# Patient Record
Sex: FEMALE
[De-identification: names, ages, dates, MRNs, and addresses within clinical notes are randomized per-mention and may not be internally consistent; named-entity substitution may affect disease eponyms.]

---

## 2018-07-05 ENCOUNTER — HOSPITAL ENCOUNTER (INPATIENT)
Dept: HOSPITAL 42 - ED | Age: 68
LOS: 2 days | Discharge: HOME | DRG: 641 | End: 2018-07-07
Attending: INTERNAL MEDICINE | Admitting: INTERNAL MEDICINE
Payer: MEDICARE

## 2018-07-05 VITALS — BODY MASS INDEX: 17.6 KG/M2

## 2018-07-05 DIAGNOSIS — M19.90: ICD-10-CM

## 2018-07-05 DIAGNOSIS — F41.1: ICD-10-CM

## 2018-07-05 DIAGNOSIS — D63.8: ICD-10-CM

## 2018-07-05 DIAGNOSIS — F41.0: ICD-10-CM

## 2018-07-05 DIAGNOSIS — F17.200: ICD-10-CM

## 2018-07-05 DIAGNOSIS — M06.9: ICD-10-CM

## 2018-07-05 DIAGNOSIS — E87.1: Primary | ICD-10-CM

## 2018-07-05 DIAGNOSIS — F31.9: ICD-10-CM

## 2018-07-05 DIAGNOSIS — F51.04: ICD-10-CM

## 2018-07-05 DIAGNOSIS — F60.4: ICD-10-CM

## 2018-07-05 LAB
ALBUMIN SERPL-MCNC: 4.3 G/DL
ALBUMIN/GLOB SERPL: 1.6 {RATIO}
ALT SERPL-CCNC: 27 U/L
APAP SERPL-MCNC: < 10 UG/ML
APPEARANCE UR: (no result)
AST SERPL-CCNC: 31 U/L
BASOPHILS # BLD AUTO: 0.01 K/MM3
BASOPHILS NFR BLD: 0.1 %
BILIRUB UR-MCNC: NEGATIVE MG/DL
BUN SERPL-MCNC: 7 MG/DL
CALCIUM SERPL-MCNC: 9.3 MG/DL
COLOR UR: YELLOW
EOSINOPHIL # BLD: 0 10*3/UL
EOSINOPHIL NFR BLD: 0.3 %
ERYTHROCYTE [DISTWIDTH] IN BLOOD BY AUTOMATED COUNT: 13.1 %
GFR NON-AFRICAN AMERICAN: > 60
GLUCOSE UR STRIP-MCNC: NEGATIVE MG/DL
GRANULOCYTES # BLD: 5.8 10*3/UL
GRANULOCYTES NFR BLD: 66.1 %
HGB BLD-MCNC: 11.2 G/DL
LEUKOCYTE ESTERASE UR-ACNC: NEGATIVE LEU/UL
LYMPHOCYTES # BLD: 2.4 10*3/UL
LYMPHOCYTES NFR BLD AUTO: 26.7 %
MCH RBC QN AUTO: 31.6 PG
MCHC RBC AUTO-ENTMCNC: 35.6 G/DL
MCV RBC AUTO: 89 FL
MONOCYTES # BLD AUTO: 0.6 10*3/UL
MONOCYTES NFR BLD: 6.8 %
OSMOLALITY,URINE: 218 MOSM/KG
PH UR STRIP: 7 [PH]
PLATELET # BLD: 335 10^3/UL
PMV BLD AUTO: 7.7 FL
PROT UR STRIP-MCNC: (no result) MG/DL
RBC # BLD AUTO: 3.54 10^6/UL
RBC # UR STRIP: (no result) /UL
RBC #/AREA URNS HPF: (no result) /HPF
SALICYLATES SERPL-MCNC: < 1 MG/DL
SP GR UR STRIP: 1.01
T4 SERPL-MCNC: 6.4 UG/DL
UROBILINOGEN UR STRIP-ACNC: 0.2 E.U./DL
WBC # BLD AUTO: 8.8 10^3/UL
WBC #/AREA URNS HPF: NEGATIVE /HPF

## 2018-07-05 NOTE — ED PDOC
Arrival/HPI





- General


Chief Complaint: Med Refill


Time Seen by Provider: 18 19:14


Historian: Patient





- History of Present Illness


Narrative History of Present Illness (Text): 





18 21:30


68-year-old female with a history of anxiety presents today stating she has 

been having intermittent anxiety attacks. Patient claims that she ran out of 

her psychiatric medications. Patient denies chest pain or shortness of breath 

at present time. Denies abdominal pain. No nausea or vomiting. Patient denies 

dizziness or weakness. Patient is unable to give an answer as to when the last 

time she took her anxiety medications. Although the [facility] aware 

website shows that she was prescribed 90 Valium on 18. 





Past Medical History





- Provider Review


Nursing Documentation Reviewed: Yes





- Travel History


Have you recently traveled outside [place] w/in the past 3 mons?: No





- Infectious Disease


Hx of Infectious Diseases: None





- Reproductive


Menopause: Yes





- Cardiac


Hx Cardiac Disorders: No





- Pulmonary


Hx Respiratory Disorders: No





- Neurological


Hx Neurological Disorder: No





- HEENT


Other/Comment: reading glasses





- Hematological/Oncological


Hx Blood Disorders: No


Other/Comment: left breast lumpectomy; benign





- Musculoskeletal/Rheumatological


Hx Herniated Disk: Yes





- Psychiatric


Hx Anxiety: Yes


Hx Panic Disorder: Yes


Hx Substance Use: No





- Surgical History


Hx  Section: Yes


Hx Hysterectomy: Yes


Other/Comment: benign tumor removed left breast





- Anesthesia


Hx Anesthesia: Yes


Hx Anesthesia Reactions: No


Hx Malignant Hyperthermia: No





Family/Social History





- Physician Review


Nursing Documentation Reviewed: Yes


Family/Social History: Unknown Family HX


Smoking Status: Light Smoker < 10 Cigarettes Daily


Hx Alcohol Use: No


Hx Substance Use: No





Allergies/Home Meds


Allergies/Adverse Reactions: 


Allergies





No Known Allergies Allergy (Verified 11/30/15 06:40)


 








Home Medications: 


 Home Meds











 Medication  Instructions  Recorded  Confirmed


 


clonazePAM [Klonopin] 3 tab PO BID 11/30/15 11/30/15


 


Citalopram Hydrobromide [Celexa] 40 mg PO DAILY 18


 


Cyclobenzaprine [Cyclobenzaprine 10 mg PO 18 





HCl]   


 


diaZEpam [Valium] 10 mg PO TID 18














Review of Systems





- Review of Systems


Constitutional: absent: Fatigue, Fevers


Respiratory: absent: SOB, Cough


Cardiovascular: absent: Chest Pain, Palpitations


Gastrointestinal: absent: Abdominal Pain, Nausea, Vomiting


Genitourinary Female: absent: Dysuria, Frequency, Hematuria


Musculoskeletal: absent: Arthralgias, Back Pain, Neck Pain


Skin: absent: Rash, Pruritis


Neurological: absent: Headache, Dizziness


Psychiatric: Anxiety.  absent: Depression, Suicidal Ideation





Physical Exam


Vital Signs Reviewed: Yes


Vital Signs











  Temp Pulse Resp BP Pulse Ox


 


 18 17:38  99.1 F  95 H  18  169/79 H  95











Temperature: Afebrile


Blood Pressure: Hypertensive


Pulse: Regular


Respiratory Rate: Normal


Appearance: Positive for: Well-Appearing, Non-Toxic, Comfortable


Pain Distress: None


Mental Status: Positive for: Alert and Oriented X 3





- Systems Exam


Head: Present: Atraumatic


Mouth: Present: Moist Mucous Membranes


Neck: Present: Normal Range of Motion


Respiratory/Chest: Present: Clear to Auscultation, Good Air Exchange.  No: 

Respiratory Distress, Accessory Muscle Use


Cardiovascular: Present: Regular Rate and Rhythm, Normal S1, S2.  No: Murmurs


Abdomen: No: Tenderness, Rebound, Guarding


Neurological: Present: GCS=15, Speech Normal


Skin: Present: Warm, Dry, Normal Color.  No: Rashes


Psychiatric: Present: Alert, Oriented x 3





Medical Decision Making


ED Course and Treatment: 





18 21:32


Patient is nontoxic well-appearing in no distress vital signs are stable.





CBC WNL


CMP NA; 121





Tylenol WNL


Salicylate WNL


Alcohol level WNL





Urine drug screen + benzos





UA; small blood





cxr: wnl





ekg NSR at 77b/m no st elevations. 








case discussed with dr. Sahu  accepts admission to [facility] for hyponatremia. she 

would like to add. urine osmolality, urine NA, serum osmolality   





all aspects of this case were discussed the attending of record. 








Impression; hyponatremia, anxiety


admit to tele. 


Reassessment Condition: Re-examined





- Lab Interpretations


Lab Results: 








 18 19:45 





 18 19:45 





 Lab Results





18 20:09: Urine Opiates Screen Negative, Urine Methadone Screen Negative, 

Ur Barbiturates Screen Negative, Ur Phencyclidine Scrn Negative, Ur 

Amphetamines Screen Negative, U Benzodiazepines Scrn Positive H, U Oth Cocaine 

Metabols Negative, U Cannabinoids Screen Negative


18 20:09: Urine Color Yellow, Urine Appearance Sl cloudy, Urine pH 7.0, 

Ur Specific Gravity 1.015, Urine Protein Trace H, Urine Glucose (UA) Negative, 

Urine Ketones Negative, Urine Blood Small H, Urine Nitrate Negative, Urine 

Bilirubin Negative, Urine Urobilinogen 0.2, Ur Leukocyte Esterase Negative, 

Urine RBC 0 - 2, Urine WBC Negative


18 19:45: Alcohol, Quantitative < 10


18 19:45: Salicylates < 1 L, Acetaminophen < 10.0 L


18 19:45: Sodium 121 L, Potassium 4.7, Chloride 88 L, Carbon Dioxide 24, 

Anion Gap 14, BUN 7, Creatinine 0.5 L, Est GFR ( Amer) > 60, Est GFR (Non

-Af Amer) > 60, Random Glucose 106, Calcium 9.3, Total Bilirubin 0.3, AST 31, 

ALT 27, Alkaline Phosphatase 50, Total Protein 7.0, Albumin 4.3, Globulin 2.7, 

Albumin/Globulin Ratio 1.6


18 19:45: WBC 8.8, RBC 3.54, Hgb 11.2 L, Hct 31.5 L, MCV 89.0, MCH 31.6, 

MCHC 35.6, RDW 13.1, Plt Count 335, MPV 7.7, Gran % 66.1, Lymph % (Auto) 26.7, 

Mono % (Auto) 6.8 H, Eos % (Auto) 0.3 L, Baso % (Auto) 0.1, Gran # 5.80, Lymph 

# (Auto) 2.4, Mono # (Auto) 0.6, Eos # (Auto) 0.0, Baso # (Auto) 0.01











- RAD Interpretation


Radiology Orders: 








18 19:15


CHEST PORTABLE [RAD] Stat 














- Medication Orders


Current Medication Orders: 











Discontinued Medications





Diazepam (Valium)  5 mg PO ONCE ONE


   Stop: 18 21:29











Disposition/Present on Arrival





- Present on Arrival


Any Indicators Present on Arrival: No


History of DVT/PE: No


History of Uncontrolled Diabetes: No


Urinary Catheter: No


History of Decub. Ulcer: No


History Surgical Site Infection Following: None





- Disposition


Have Diagnosis and Disposition been Completed?: Yes


Diagnosis: 


 Anxiety, Hyponatremia





Disposition: HOSPITALIZED


Disposition Time: 21:28


Patient Plan: Admission


Patient Problems: 


 Current Active Problems











Problem Status Onset


 


Anxiety Acute  


 


Hyponatremia Acute  











Condition: GOOD


Discharge Instructions (ExitCare):  Anxiety, Adult (DC)


Additional Instructions: 


Follow-up with primary care physician within the next 2 days


Follow-up with a psychiatrist within the next 2 days


Increase fluids


Return if symptoms worsen persist or if new concerning symptoms develop


Referrals: 


Chato Pacheco MD [Non-Staff] - Follow up with primary


Kat Sahu MD [Staff Provider] - Follow up with primary


Forms:  In[employer] (English), WORK NOTE

## 2018-07-06 LAB
% IRON SATURATION: 40 %
ALBUMIN SERPL-MCNC: 4.1 G/DL
ALBUMIN/GLOB SERPL: 1.4 {RATIO}
ALT SERPL-CCNC: 41 U/L
AST SERPL-CCNC: 35 U/L
BUN SERPL-MCNC: 8 MG/DL
CALCIUM SERPL-MCNC: 9.7 MG/DL
FERRITIN SERPL-MCNC: 116 NG/ML
FOLATE SERPL-MCNC: 18.7 NG/ML
GFR NON-AFRICAN AMERICAN: > 60
IRON SERPL-MCNC: 126 UG/DL
TIBC SERPL-MCNC: 318 UG/DL
VIT B12 SERPL-MCNC: 348 PG/ML

## 2018-07-06 RX ADMIN — NAPROXEN SODIUM SCH: 550 TABLET ORAL at 17:44

## 2018-07-06 RX ADMIN — NAPROXEN SODIUM SCH MG: 550 TABLET ORAL at 17:42

## 2018-07-06 NOTE — RAD
HISTORY:

pes eval  



COMPARISON:

11/30/2015 



FINDINGS:



LUNGS:

No active pulmonary disease.



PLEURA:

No significant pleural effusion identified, no pneumothorax apparent.



CARDIOVASCULAR:

Normal.



OSSEOUS STRUCTURES:

No significant abnormalities.



VISUALIZED UPPER ABDOMEN:

Normal.



OTHER FINDINGS:

None.



IMPRESSION:

No active disease.
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
## 2018-07-06 NOTE — CARD
--------------- APPROVED REPORT --------------





EKG Measurement

Heart [id number]

SD [id number]

[id number]

[id number]

[id number]



<Conclusion>

Normal sinus rhythm

Normal ECG

No change except the rate is faster

## 2018-07-07 VITALS — HEART RATE: 79 BPM | TEMPERATURE: 97.8 F | DIASTOLIC BLOOD PRESSURE: 82 MMHG | SYSTOLIC BLOOD PRESSURE: 137 MMHG

## 2018-07-07 VITALS — OXYGEN SATURATION: 99 % | RESPIRATION RATE: 18 BRPM

## 2018-07-07 LAB
BUN SERPL-MCNC: 17 MG/DL
CALCIUM SERPL-MCNC: 9.4 MG/DL
GFR NON-AFRICAN AMERICAN: > 60
HGB BLD-MCNC: 11.4 G/DL

## 2018-07-07 RX ADMIN — NAPROXEN SODIUM SCH MG: 550 TABLET ORAL at 09:59

## 2018-07-07 RX ADMIN — NAPROXEN SODIUM SCH: 550 TABLET ORAL at 17:45

## 2018-07-08 NOTE — DS
DATE OF EXAM:  07/07/2018



FINAL DIAGNOSES:  Hyponatremia, resolved; anemia of chronic disease;

chronic degenerative arthritis; chronic depression; chronic anxiety;

chronic insomnia; chronic smoking.



DISPOSITION:  Home.



FOLLOWUP:  The patient was advised to follow up with her PMD, Dr. Zapata in

48 hours and Dr. Pacheco, psychiatrist in 48 hours.



DISCHARGE MEDICATIONS:  She was given prescriptions for Valium 10 mg p.o.

three times a day, #6, no refills and Celexa 40 mg p.o. daily, #2, no

refills.  The patient was also advised to  Nicoderm smoking patch 7

mg per 24 hour and to cease smoking and also to have followup basic

metabolic panel under the direction of her PMD in the next 48 hours.



SUMMARY:  This is a 68-year-old female who was admitted to [facility] with hyponatremia, was found to have anemia of chronic disease and

was treated successfully with initially hypertonic saline, then normal

saline and p.o. fluid restriction.  The patient was seen by psychiatrist,

Dr. Al Bond who cleared the patient for discharge and advised her to

follow up for her routine psychiatric issues with Dr. Pacheco and I have

advised this patient to follow up with her PMD, Dr. Zapata regarding

management of her newly noted now resolved hyponatremia and chronic issues

of degenerative arthritis, anxiety neurosis, anemia of chronic disease and

at the time of discharge, she was noted to have a temperature of 98.3,

respirations 20, pulse 80 and blood pressure 142/70.  She was in a normal

sinus rhythm.  Current labs shows sodium 138, K 5.4, chloride 101, bicarb

30, BUN 17, creatinine 0.7, random blood sugar 93.  Iron 126, TIBC 318,

percent saturation 40, ferritin 116 normal.  All liver function testing was

normal.  B12 348.  Folic acid 18.7, normal T4 6.4 normal and TSH 1.09

normal.  Urinalysis showed trace protein, urine sodium 41, urine osmolarity

218.  Toxicology screen positive for benzodiazepines and white count 8800,

hemoglobin 11.4, hematocrit 33.7, platelets 335,000.  All of the above was

reviewed with the patient in the presence of her nurse and all questions

were answered.  Greater than 35 minutes was spent in the discharge

management of this patient today.  Hopefully, she will be compliant with

the above recommendations as outlined by myself and Dr. Bond from

Psychiatry.





__________________________________________

Kat Sahu MD





DD:  07/07/2018 15:59:13

DT:  07/07/2018 16:01:06

Job # [id number]



[email or web address]

## 2018-07-09 NOTE — HP
DATE OF EXAM:  07/06/2018



HISTORY OF PRESENT ILLNESS:  This 68-year-old female was examined at her

bedside and this case was reviewed in detail with herself, Dr. Bond from

Psychiatry, and emergency room physicians.  She presented to the emergency

room complaining of a panic attack.  She was requesting additional Valium

because she states she ran out of this prescription.  She is under the

psychiatric care of Dr. Pacheco, psychiatrist, and was last prescribed 90

Valium on 06/11/2018.  While in the emergency room, she was noted to be

anxious, ataxic, and hyponatremic and was admitted for further evaluation

of the above.  On further evaluation of this patient, she states that she

has chronic insomnia, chronic anxiety, and has a history of suicidal

attempt in her past.  Her EMR states that she has a history of left breast

lumpectomy for benign lesion in the past and is under the medical care of

Dr. Zapata at the [facility] in [place], [place], and

follows with Dr. Pacheco from Psychiatry and a rheumatologist as well.



SOCIAL HISTORY:  The patient states she is a smoker, social drinker,

non-IV-drug misuser.



ALLERGIES:  DENIED ANY ALLERGIES TO MEDICATION.



MEDICATIONS:  States as an outpatient, she takes Klonopin, dose unknown,

twice daily; Celexa 60 mg p.o. daily; Flexeril 10 mg daily; and Valium 10

mg p.o. t.i.d.



REVIEW OF SYSTEMS:

CONSTITUTIONAL:  Denied fever and chills.

HEAD:  No headache.

EYE:  No change in visual acuity.

Ear:  No hearing loss.

THROAT:  No swallowing difficulty.

NECK:  No stiffness.

CARDIAC:  No chest pain, no palpitation.

PULMONARY:  No cough.  No hemoptysis.

GI:  No hematemesis.  No melena.

:  No dysuria.

SKIN:  No rash.

VASCULAR:  No claudication.

PSYCHOLOGICAL:  She has chronic anxiety, depression, and insomnia.

SKIN:  No active rash at present.



On the cardiac monitor, she is in a normal sinus rhythm.



PHYSICAL EXAMINATION:

VITAL SIGNS:  Her temperature was 98.3, respirations 17, pulse 93, and

blood pressure 134/88.

HEENT:  Head:  Normocephalic, atraumatic.  Eyes:  No icterus.  Ears: 

Clear.  Throat:  Noninjected.

NECK:  Supple.

HEART:  Regular S1, S2.

LUNGS:  Clear.

ABDOMEN:  Soft.

EXTREMITIES:  No edema.

SKIN:  Without rash.

NEUROLOGICAL:  Grossly intact.

PSYCHOLOGICAL:  Alert and anxious.

VASCULAR:  Legs warm to touch.



LABORATORY DATA:  Her labs show a toxicology screen positive for

benzodiazepines.  White count 8800, hemoglobin 11.2, hematocrit 31.5,

platelets 335,000.  Admission sodium was 121, K 4.7, chloride 88, bicarb

24, BUN 7, creatinine 0.5, random blood sugar 106.  Bilirubin 0.3, AST 31,

ALT is 27, alk phos 50.  Serum osmolality, low 246.  T4 normal 6.4, TSH

normal 1.09.  Urine osmolality, low 218.  Urine sodium high, 41.  Chest

x-ray was reviewed and showed no active disease and EKG was reviewed and

showed normal sinus rhythm with nonspecific ST-T wave changes.



IMPRESSION:  A 68-year-old female with chronic anxiety, chronic depression,

history of suicidal ideation in her past, history of insomnia; now admitted

with hyponatremia of unclear etiology.  Patient denied any use of

diuretics, vomiting, or diarrhea.



PLAN:  The plan, as outlined, will be to continue 3% saline at 35 mL/hour. 

She will be seen by Dr. Bond who is outlining medication including Celexa

40 mg p.o. daily, Flexeril 10 mg p.o. at nighttime, Nicoderm 7 mg per 24

hours smoking patch to arm daily, and Valium 10 mg p.o. t.i.d.  She is also

to receive naproxen 500 mg p.o. b.i.d. for her chronic degenerative

arthritic complaints.  I have requested a CT of head, chest, abdomen, and

pelvis for completeness sake, which the patient refuses and based on a.m.

blood work, patient will be readied for discharge to home and for follow up

with her PMD, Dr. Zapata; her psychiatrist, Dr. Pacheco; and her

rheumatologist.



Greater than 75 minutes was spent in the care management, review of labs,

orders, x-rays, EKGs, and outlining of treatment plan for this patient

today.  All questions were answered.







__________________________________________

Kat Sahu MD



DD:  07/07/2018 15:54:42

DT:  07/07/2018 15:57:52

[facility] # [id number]

TIANNA

## 2018-09-02 ENCOUNTER — HOSPITAL ENCOUNTER (EMERGENCY)
Dept: HOSPITAL 42 - ED | Age: 68
Discharge: HOME | End: 2018-09-02
Payer: MEDICARE

## 2018-09-02 VITALS — OXYGEN SATURATION: 99 %

## 2018-09-02 VITALS — BODY MASS INDEX: 25.6 KG/M2

## 2018-09-02 VITALS — HEART RATE: 75 BPM | SYSTOLIC BLOOD PRESSURE: 127 MMHG | DIASTOLIC BLOOD PRESSURE: 53 MMHG

## 2018-09-02 VITALS — TEMPERATURE: 98 F | RESPIRATION RATE: 19 BRPM

## 2018-09-02 DIAGNOSIS — F41.9: Primary | ICD-10-CM

## 2018-09-02 DIAGNOSIS — F17.210: ICD-10-CM

## 2018-09-02 NOTE — ED PDOC
Arrival/HPI





- General


Historian: Patient





- History of Present Illness


Time/Duration: 1 week


Symptom Course: Unchanged


Severity Level: 1





<Quirino Zabala - Last Filed: 09/02/18 22:44>





<Марина Husain - Last Filed: 09/02/18 23:34>





- General


Time Seen by Provider: 09/02/18 21:19





- History of Present Illness


Narrative History of Present Illness (Text): 


09/02/18 21:55


Patient is a 68 year old female with PMH of anxiety and depression presenting 

to the ED with anxiety. Patient states that she is feeling anxious because she 

ran out of her medication and cannot refill them until Tuesday. She states that 

she needs to take her medications now. Patient denies suicidal or homicidal 

ideation. Patient denies headaches, fevers, chills, chest pain, abdominal pain, 

or urinary symptoms.


 (Quirino Zabala)





Past Medical History





- Provider Review


Nursing Documentation Reviewed: Yes





- Travel History


Have you recently traveled outside [place] w/in the past 3 mons?: No





- Past History


Past History: No Previous





- Infectious Disease


Hx of Infectious Diseases: None





- Cardiac


Hx Cardiac Disorders: No





- Pulmonary


Hx Respiratory Disorders: No





- Neurological


Hx Neurological Disorder: No





- HEENT


Other/Comment: reading glasses





- Hematological/Oncological


Hx Blood Disorders: No


Other/Comment: left breast lumpectomy; benign





- Musculoskeletal/Rheumatological


Hx Falls: No


Hx Herniated Disk: Yes





- Genitourinary/Gynecological


Hx Sexually Transmitted Diseases: No





- Psychiatric


Hx Anxiety: Yes


Hx Panic Disorder: Yes


Hx Substance Use: No





- Surgical History


Hx Hysterectomy: Yes


Other/Comment: benign tumor removed left breast





- Anesthesia


Hx Anesthesia: Yes


Hx Anesthesia Reactions: No


Hx Malignant Hyperthermia: No





<Quirino Zabala - Last Filed: 09/02/18 22:44>





Family/Social History





- Physician Review


Nursing Documentation Reviewed: Yes


Family/Social History: No Known Family HX


Smoking Status: Heavy Smoker > 10 Cigarettes Daily


Hx Alcohol Use: Yes


Hx Substance Use: No





<Quirino Zabala - Last Filed: 09/02/18 22:44>





Allergies/Home Meds





<Quirino Zabala - Last Filed: 09/02/18 22:44>





<Марина Husain - Last Filed: 09/02/18 23:34>


Allergies/Adverse Reactions: 


Allergies





No Known Allergies Allergy (Verified 09/02/18 21:45)


 








Home Medications: 


 Home Meds











 Medication  Instructions  Recorded  Confirmed


 


clonazePAM [Klonopin] 3 tab PO BID 11/30/15 09/02/18


 


Citalopram Hydrobromide [Celexa] 40 mg PO DAILY 07/05/18 09/02/18


 


Cyclobenzaprine [Cyclobenzaprine 10 mg PO DAILY 07/05/18 09/02/18





HCl]   


 


diaZEpam [Valium] 10 mg PO TID 07/05/18 09/02/18














Review of Systems





- Physician Review


All systems were reviewed & negative as marked: Yes





- Review of Systems


Constitutional: Normal.  absent: Fevers, Night Sweats


Eyes: Normal.  absent: Vision Changes


ENT: Normal


Respiratory: Normal.  absent: SOB


Cardiovascular: Normal.  absent: Chest Pain


Gastrointestinal: Normal.  absent: Abdominal Pain, Constipation, Diarrhea, 

Nausea, Vomiting


Genitourinary Female: Normal.  absent: Dysuria, Frequency, Hematuria


Musculoskeletal: Normal.  absent: Arthralgias


Skin: Normal.  absent: Rash, Pruritis


Neurological: Normal.  absent: Headache


Psychiatric: Anxiety





<Quirino Zabala - Last Filed: 09/02/18 22:44>





- Review of Systems


Psychiatric: Depression.  absent: Suicidal Ideation (homicidal ideation)





<Марина Husain - Last Filed: 09/02/18 23:34>





Physical Exam


Vital Signs Reviewed: Yes


Temperature: Afebrile


Blood Pressure: Normal


Pulse: Regular


Respiratory Rate: Normal


Appearance: Positive for: Well-Appearing


Pain Distress: None


Mental Status: Positive for: Alert and Oriented X 3





- Systems Exam


Head: Present: Atraumatic, Normocephalic


Pupils: Present: PERRL


Extroacular Muscles: Present: EOMI


Conjunctiva: Present: Normal


Mouth: Present: Moist Mucous Membranes


Respiratory/Chest: Present: Clear to Auscultation, Good Air Exchange.  No: 

Respiratory Distress, Accessory Muscle Use, Wheezes, Rales, Rhonchi


Cardiovascular: Present: Regular Rate and Rhythm, Normal S1, S2.  No: Murmurs, 

Rub, Gallop


Abdomen: Present: Normal Bowel Sounds.  No: Tenderness, Distention, Peritoneal 

Signs


Upper Extremity: Present: Normal Inspection.  No: Cyanosis, Edema


Lower Extremity: Present: Normal Inspection.  No: Edema, CALF TENDERNESS


Neurological: Present: GCS=15, CN II-XII Intact, Speech Normal


Skin: Present: Warm, Dry, Normal Color.  No: Rashes


Psychiatric: Present: Alert, Oriented x 3, Anxious





<Quirino Zabala - Last Filed: 09/02/18 22:44>





Vital Signs











  Temp Pulse Resp BP Pulse Ox


 


 09/02/18 22:54  98 F  75  19  127/53 L  99


 


 09/02/18 22:23  98 F  85  19  124/75  99


 


 09/02/18 21:36  98 F  78  19  116/53 L  98














Medical Decision Making


Reassessment Condition: Re-examined, Improved





<Quirino Zabala - Last Filed: 09/02/18 22:44>





<Марина Husain - Last Filed: 09/02/18 23:34>


ED Course and Treatment: 


09/02/18 21:54


Impression:


Patient is a 68 year old female presenting to the ED with anxiety.





Differential Diagnosis included but are not limited to:  


- Anxiety





Plan:


-- Diazepam


-- Flexeril





Progress Notes:


09/02/18 21:58


- Patient examined, patient states she is feeling anxious and ran out of her 

medications and need to take them today.





09/02/18 22:12


- Patient was given her home medications diazepam and flexeril. She states she 

is feeling much better and wants to go home. Patient is stable for discharge.


 (Quirino Zabala)





Patient Seen With Resident:


In agreement with resident note which contains more details about the patient. 

Patient was seen and evaluated with resident. Came up with plan and treatment 

together. 68 year old female presents complaining of anxiety and depression. 

She states she ran out of her medication that she needs to take today. 


Plan: 


-- Flexeril, Valium 


-- Reassess and disposition (Марина Husain)





- Medication Orders


Current Medication Orders: 














Discontinued Medications





Cyclobenzaprine HCl (Flexeril)  10 mg PO STAT STA


   Stop: 09/02/18 21:49


   Last Admin: 09/02/18 22:04  Dose: 10 mg





Diazepam (Valium)  10 mg PO ONCE ONE


   PRN Reason: Protocol


   Stop: 09/02/18 21:50


   Last Admin: 09/02/18 22:04  Dose: 10 mg











<Quirino Zabala - Last Filed: 09/02/18 22:44>





- PA / NP / Resident Statement


MD/ has reviewed & agrees with the documentation as recorded.


MD/ has examined the patient and agrees with the treatment plan.





- Scribe Statement


The provider has reviewed the documentation as recorded by the Scribe





<Марина Husain - Last Filed: 09/02/18 23:34>





- Scribe Statement





Angelic Beck





Provider Scribe Attestation:


All medical record entries made by the Scribe were at my direction and 

personally dictated by me. I have reviewed the chart and agree that the record 

accurately reflects my personal performance of the history, physical exam, 

medical decision making, and the department course for this patient. I have 

also personally directed, reviewed, and agree with the discharge instructions 

and disposition.


 (Марина Husain)





Disposition/Present on Arrival





- Present on Arrival


Any Indicators Present on Arrival: No


History of DVT/PE: No


History of Uncontrolled Diabetes: No


Urinary Catheter: No


History of Decub. Ulcer: No


History Surgical Site Infection Following: None





- Disposition


Have Diagnosis and Disposition been Completed?: Yes


Disposition Time: 22:18


Patient Plan: Discharge





<Quirino Zabala - Last Filed: 09/02/18 22:44>





<Марина Husain - Last Filed: 09/02/18 23:34>





- Disposition


Diagnosis: 


 Anxiety





Disposition: HOME/ ROUTINE


Condition: IMPROVED


Additional Instructions: 





NAEL WILLARD, thank you for letting us take care of you today. Your provider was  

and you were treated for anxiety. The emergency medical care you received today 

was directed at your acute symptoms. If you were prescribed any medication, 

please fill it and take as directed. It may take several days for your symptoms 

to resolve. Return to the Emergency Department if your symptoms worsen, do not 

improve, or if you have any other problems.





Please contact your doctor or call one of the physicians/clinics you have been 

referred to that are listed on the Patient Visit Information form that is 

included in your discharge packet. Bring any paperwork you were given at 

discharge with you along with any medications you are taking to your follow up 

visit. Our treatment cannot replace ongoing medical care by a primary care 

provider outside of the emergency department.





Thank you for allowing the [facility] team to be part of your care today.








If you had an X-Ray or CT scan: A Radiologist will review the ED reading if any 

change in treatment is needed we will contact you.***





If you had a blood, urine, or wound culture: It will take several days for the 

results, if any change in treatment is needed we will contact you.***





If you had an STI test: It will take 48 hours for the results. Please call 

after 1 week if you have not heard back.***


Referrals: 


Kelly Vick MD [Medical Doctor] - Follow up with primary


Forms:  [employer] (English)

## 2018-12-13 ENCOUNTER — HOSPITAL ENCOUNTER (EMERGENCY)
Dept: HOSPITAL 42 - ED | Age: 68
Discharge: LEFT BEFORE DISCHARGE | End: 2018-12-13
Payer: MEDICARE

## 2018-12-13 VITALS — HEART RATE: 86 BPM | DIASTOLIC BLOOD PRESSURE: 89 MMHG | SYSTOLIC BLOOD PRESSURE: 124 MMHG

## 2018-12-13 VITALS — RESPIRATION RATE: 18 BRPM | OXYGEN SATURATION: 100 % | TEMPERATURE: 98.5 F

## 2018-12-13 VITALS — BODY MASS INDEX: 20.4 KG/M2

## 2018-12-13 DIAGNOSIS — M06.9: ICD-10-CM

## 2018-12-13 DIAGNOSIS — R55: Primary | ICD-10-CM

## 2018-12-13 DIAGNOSIS — F17.210: ICD-10-CM
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
## 2018-12-13 LAB
ALBUMIN SERPL-MCNC: 3.9 G/DL (ref 3–4.8)
ALBUMIN/GLOB SERPL: 1.3 {RATIO} (ref 1.1–1.8)
ALT SERPL-CCNC: 33 U/L (ref 7–56)
APPEARANCE UR: CLEAR
AST SERPL-CCNC: 48 U/L (ref 14–36)
BILIRUB UR-MCNC: NEGATIVE MG/DL
BUN SERPL-MCNC: 8 MG/DL (ref 7–21)
CALCIUM SERPL-MCNC: 9.2 MG/DL (ref 8.4–10.5)
COLOR UR: YELLOW
ERYTHROCYTE [DISTWIDTH] IN BLOOD BY AUTOMATED COUNT: 13 % (ref 11.5–14.5)
GFR NON-AFRICAN AMERICAN: > 60
GLUCOSE UR STRIP-MCNC: NEGATIVE MG/DL
HGB BLD-MCNC: 10.8 G/DL (ref 12–16)
LEUKOCYTE ESTERASE UR-ACNC: NEGATIVE LEU/UL
MCH RBC QN AUTO: 31.3 PG (ref 25–35)
MCHC RBC AUTO-ENTMCNC: 34.3 G/DL (ref 31–37)
MCV RBC AUTO: 91.3 FL (ref 80–105)
PH UR STRIP: 6.5 [PH] (ref 4.7–8)
PLATELET # BLD: 386 10^3/UL (ref 120–450)
PMV BLD AUTO: 8.9 FL (ref 7–11)
PROT UR STRIP-MCNC: NEGATIVE MG/DL
RBC # BLD AUTO: 3.45 10^6/UL (ref 3.5–6.1)
RBC # UR STRIP: NEGATIVE /UL
SP GR UR STRIP: <= 1.005 (ref 1–1.03)
TROPONIN I SERPL-MCNC: < 0.01 NG/ML
UROBILINOGEN UR STRIP-ACNC: 0.2 E.U./DL
WBC # BLD AUTO: 8.9 10^3/UL (ref 4.5–11)

## 2018-12-13 PROCEDURE — 70450 CT HEAD/BRAIN W/O DYE: CPT

## 2018-12-13 PROCEDURE — 99285 EMERGENCY DEPT VISIT HI MDM: CPT

## 2018-12-13 PROCEDURE — 73610 X-RAY EXAM OF ANKLE: CPT

## 2018-12-13 PROCEDURE — 82550 ASSAY OF CK (CPK): CPT

## 2018-12-13 PROCEDURE — 83615 LACTATE (LD) (LDH) ENZYME: CPT

## 2018-12-13 PROCEDURE — 84484 ASSAY OF TROPONIN QUANT: CPT

## 2018-12-13 PROCEDURE — 73590 X-RAY EXAM OF LOWER LEG: CPT

## 2018-12-13 PROCEDURE — 93005 ELECTROCARDIOGRAM TRACING: CPT

## 2018-12-13 PROCEDURE — 81003 URINALYSIS AUTO W/O SCOPE: CPT

## 2018-12-13 PROCEDURE — 72131 CT LUMBAR SPINE W/O DYE: CPT

## 2018-12-13 PROCEDURE — 72125 CT NECK SPINE W/O DYE: CPT

## 2018-12-13 PROCEDURE — 80053 COMPREHEN METABOLIC PANEL: CPT

## 2018-12-13 PROCEDURE — 72128 CT CHEST SPINE W/O DYE: CPT

## 2018-12-13 PROCEDURE — 73560 X-RAY EXAM OF KNEE 1 OR 2: CPT

## 2018-12-13 PROCEDURE — 85027 COMPLETE CBC AUTOMATED: CPT

## 2018-12-13 NOTE — CT
Date of service: 



12/13/2018



PROCEDURE:  CT Thoracic Spine without contrast



HISTORY:

injury







COMPARISON:

None available.



TECHNIQUE:

Axial computed tomography images were obtained of the thoracic spine 

without intravenous contrast. Coronal and sagittal reformatted images 

were created and reviewed.



Radiation dose:



Total exam DLP = 217.87 mGy-cm.



This CT exam was performed using one or more of the following dose 

reduction techniques: Automated exposure control, adjustment of the 

mA and/or kV according to patient size, and/or use of iterative 

reconstruction technique.



FINDINGS:



VERTEBRAE:

Unremarkable. No fracture. Normal alignment.



DISCS/SPINAL CANAL/NEURAL FORAMINA:

Within the limits of the CT technique, no disc herniation seen. No 

central canal or neural foraminal stenosis..



PARASPINAL SOFT TISSUES:

Unremarkable.



OTHER FINDINGS:

Multilevel facet arthropathy. 



IMPRESSION:

No fracture.

## 2018-12-13 NOTE — CT
Date of service: 



12/13/2018



PROCEDURE:  CT Lumbar Spine without contrast



HISTORY:

injury



COMPARISON:

None available.



TECHNIQUE:

Axial computed tomography images were obtained of the lumbar spine 

without the use of intravenous contrast. Coronal and sagittal 

reformatted images were created and reviewed. 



Radiation dose:



Total exam DLP = 328.99 mGy-cm.



This CT exam was performed using one or more of the following dose 

reduction techniques: Automated exposure control, adjustment of the 

mA and/or kV according to patient size, and/or use of iterative 

reconstruction technique.



FINDINGS:



VERTEBRAE:

Unremarkable. No fracture. Normal alignment. 



DISCS/SPINAL CANAL/NEURAL FORAMINA:

L1-2:     Unremarkable.



L2-3:     Unremarkable.



L3-4:     Unremarkable.



L4-5:     Unremarkable.



L5-S1:  Central disc herniation.



PARASPINAL SOFT TISSUES:

Unremarkable. 



OTHER FINDINGS:

Lower lumbar facet arthropathy. 



IMPRESSION:

No fracture.  Central disc herniation at L5-S1 with anterior epidural 

fat indentation.  Lower lumbar facet arthropathy.

## 2018-12-13 NOTE — CT
Date of service: 



12/13/2018



PROCEDURE:  CT HEAD WITHOUT CONTRAST.



HISTORY:

Injury 



COMPARISON:

None available.



TECHNIQUE:

Axial computed tomography images were obtained through the head/brain 

without intravenous contrast.  



Radiation dose:



Total exam DLP = 783.41 mGy-cm.



This CT exam was performed using one or more of the following dose 

reduction techniques: Automated exposure control, adjustment of the 

mA and/or kV according to patient size, and/or use of iterative 

reconstruction technique.



FINDINGS:



HEMORRHAGE:

No intracranial hemorrhage. 



BRAIN:

Gray-white matter differentiation is preserved.  There is no mass, 

mass effect or abnormal extra-axial fluid collection.  There is no 

territorial infarction. The midline sagittal structures are normal.



VENTRICLES:

There is mild age-related global parenchymal volume loss and 

proportionate enlargement of the ventricles and cortical sulci. 



CALVARIUM:

There is no calvarial fracture or extracranial soft tissue swelling.



PARANASAL SINUSES:

Predominantly clear.



MASTOID AIR CELLS:

Predominantly clear.



OTHER FINDINGS:

None.



IMPRESSION:

No acute intracranial abnormality.



A preliminary report was provided by [facility].

## 2018-12-13 NOTE — ED PDOC
Arrival/HPI





- General


Chief Complaint: Trauma


Time Seen by Provider: 12/13/18 01:02


Historian: Patient





- History of Present Illness


Narrative History of Present Illness (Text): 


12/13/18 01:20


Jonelle Ojeda is a 68 year old female, whose past medical history includes 

rheumatoid arthritis, chronic back pain, anxiety, and depression, who presents 

to the emergency department  brought in by EMS status post fall at 21:00 

yesterday. Patient states she felt dizzy while walking and fell down a flight of

stairs. Patient now complaining of neck pain, lower back pain, and bilateral 

lower leg pain. Patient requesting pain medication. The patient denies any 

fever, chills, chest pain, shortness of breath, nausea, vomiting, urinary 

symptoms, headache, or any other complaints. 


Symptom Onset: Gradual


Symptom Course: Unchanged


Activities at Onset: Light


Context: Home, Slipped





Past Medical History





- Provider Review


Nursing Documentation Reviewed: Yes





- Past History


Past History: No Previous





- Infectious Disease


Hx of Infectious Diseases: None





- Cardiac


Hx Cardiac Disorders: No





- Pulmonary


Hx Respiratory Disorders: No





- Neurological


Hx Neurological Disorder: No





- HEENT


Other/Comment: reading glasses





- Hematological/Oncological


Hx Blood Disorders: No


Other/Comment: left breast lumpectomy; benign





- Musculoskeletal/Rheumatological


Hx Falls: No


Hx Herniated Disk: Yes





- Genitourinary/Gynecological


Hx Sexually Transmitted Diseases: No





- Psychiatric


Hx Anxiety: Yes


Hx Panic Disorder: Yes


Hx Substance Use: No





- Surgical History


Hx Hysterectomy: Yes


Other/Comment: benign tumor removed left breast





- Anesthesia


Hx Anesthesia: Yes


Hx Anesthesia Reactions: No


Hx Malignant Hyperthermia: No





Family/Social History





- Physician Review


Nursing Documentation Reviewed: Yes


Family/Social History: Unknown Family HX


Smoking Status: Heavy Smoker > 10 Cigarettes Daily


Hx Alcohol Use: Yes


Hx Substance Use: No





Allergies/Home Meds


Allergies/Adverse Reactions: 


Allergies





No Known Allergies Allergy (Verified 09/02/18 21:45)


   








Home Medications: 


                                    Home Meds











 Medication  Instructions  Recorded  Confirmed


 


clonazePAM [Klonopin] 3 tab PO BID 11/30/15 09/02/18


 


Citalopram Hydrobromide [Celexa] 40 mg PO DAILY 07/05/18 09/02/18


 


Cyclobenzaprine [Cyclobenzaprine 10 mg PO DAILY 07/05/18 09/02/18





HCl]   


 


diaZEpam [Valium] 10 mg PO TID 07/05/18 09/02/18














Review of Systems





- Physician Review


All systems were reviewed & negative as marked: Yes





- Review of Systems


Constitutional: Normal.  absent: Fevers


Eyes: Normal


ENT: Normal


Respiratory: Normal.  absent: SOB, Cough


Cardiovascular: Normal.  absent: Chest Pain


Gastrointestinal: Normal.  absent: Abdominal Pain, Diarrhea, Nausea, Vomiting


Genitourinary Female: Normal.  absent: Dysuria, Frequency, Hematuria, Urine 

Output Changes


Musculoskeletal: Arthralgias, Back Pain, Neck Pain


Skin: Normal.  absent: Rash


Neurological: Dizziness.  absent: Headache


Endocrine: Normal


Hemo/Lymphatic: Normal


Psychiatric: Normal





Physical Exam


Vital Signs Reviewed: Yes


Temperature: Afebrile


Blood Pressure: Normal


Pulse: Regular


Respiratory Rate: Normal


Appearance: Positive for: Well-Appearing, Non-Toxic, Comfortable


Pain Distress: None


Mental Status: Positive for: Alert and Oriented X 3





- Systems Exam


Head: Present: Atraumatic, Normocephalic


Pupils: Present: PERRL


Extroacular Muscles: Present: EOMI


Conjunctiva: Present: Normal


Mouth: Present: Moist Mucous Membranes


Neck: Present: Paraspinal Tenderness (Paracervical tenderness).  No: Meningeal 

Signs, MIDLINE TENDERNESS


Respiratory/Chest: Present: Clear to Auscultation, Good Air Exchange.  No: Res

piratory Distress, Accessory Muscle Use


Cardiovascular: Present: Regular Rate and Rhythm, Normal S1, S2.  No: Murmurs


Abdomen: No: Tenderness, Distention, Peritoneal Signs


Back: Present: Paraspinal Tenderness (Paralumbar tenderness).  No: CVA 

Tenderness, Midline Tenderness


Upper Extremity: Present: Normal Inspection.  No: Cyanosis, Edema


Lower Extremity: Present: Normal Inspection.  No: Edema


Neurological: Present: GCS=15, CN II-XII Intact, Speech Normal


Skin: Present: Warm, Dry, Normal Color.  No: Rashes


Psychiatric: Present: Alert, Oriented x 3, Normal Insight, Normal Concentration





Medical Decision Making


ED Course and Treatment: 


12/13/18 01:21


Impression:


68 year old female presents s/p fall complaining of lower back pain, neck pain, 

and bilateral lower leg pain.





Plan:


-- CT Head w/o contrast


-- CT Cervical Spine w/o contrast


-- CT Thoracic Spine w/o contrast


-- CT Lumbar Spine w/o contrast


-- EKG


-- Labs, alcohol level, cardiac enzymes, alcohol level


-- Urinalysis


-- XR Bilateral Knees


-- XR Bilateral Tibia/Fibula


-- XR Bilateral Ankles


-- Reassess and disposition





Prior Visits:


Notes and results from previous visits were reviewed.








Progress Notes:


Reviewed EKG, sinus bradycardia at 56 bpm. No ST-segment elevations or 

depressions, no T-wave inversions, normal intervals.





12/13/18 03:50


Reviewed radiology, XR Bilateral Knees shows no acute processes, no fractures.


XR Bilateral Tibia/Fibula shows no acute processes, no fractures.


XR Bilateral Ankles  shows no acute processes, no fractures.





CT Head:


Normal size of the ventricles and extra-axial spaces for the patient's age.  

Normal white matter tracts of the supratentorial brain.  Normal basal ganglia 

and thalami.  Normal brainstem.  Normal cerebellum.        


There is no demonstrated extra-axial, intraparenchymal, or intraventricular 

hemorrhage.


There are no findings of an acute ischemic infarction.  


Normal calvarium.  There is no demonstrated fracture.


Normal soft tissue structures. 


Normal visualized paranasal sinuses.


IMPRESSION:


Normal unenhanced CT scan of the brain.


Electronically signed on Dec 13, 2018 3:17:17 AM EST by:


Lynda Julian M.D., Certified by ABR, MSK, Neuroradiology





CT Cervical Spine:


There are diffuse spondylotic changes. Findings are demonstrated by disc space 

narrowing, osteophyte formation and degenerative endplate changes. Facet joint 

arthropathy is noted. No fracture or dislocation is seen. No aggressive bone 

lesion is noted.


Moderate multilevel degenerative disc disease more prominent from C3-C7.


Impression:


Spondylosis.


Multilevel facet joint arthropathy.


No acute bone pathology.


Electronically signed on Dec 13, 2018 3:21:15 AM EST by:


Lynda Julian M.D., Certified by [facility], MSK, Neuroradiology





CT Thoracic Spine:


There are diffuse spondylotic changes. Findings are demonstrated by disc space 

narrowing, osteophyte formation and degenerative endplate changes. Facet joint 

arthropathy is noted. No fracture or dislocation is seen. No aggressive bone 

lesion is noted.


Moderate multilevel degenerative disc disease more prominent from T2-T12 levels.


Impression:


Spondylosis.


Multilevel facet joint arthropathy.


No acute bone pathology.


Electronically signed on Dec 13, 2018 3:27:44 AM EST by:


Lynda Julian M.D., Certified by ABR, MSK, Neuroradiology





CT Lumbar Spine:


There are diffuse spondylotic changes. Findings are demonstrated by disc space 

narrowing, osteophyte formation and degenerative endplate changes. Facet joint 

arthropathy is noted. No fracture or dislocation is seen. No aggressive bone 

lesion is noted.


Moderate multilevel degenerative disc disease more prominent from T2-T12 levels.


Impression:


Spondylosis.


Multilevel facet joint arthropathy.


No acute bone pathology.


Electronically signed on Dec 13, 2018 3:31:48 AM EST by:


Lynda Julian M.D., Certified by [facility], Neuroradiology





12/13/18 04:02


Case discussed with Dr. Parmar, who is aware and agrees with plan. Accepts pt in 

to his service. Pt will go to Telemetry observation for near-syncope and falls. 

Medical resident notified.





- Lab Interpretations


I have reviewed the lab results: Yes





- RAD Interpretation


: ED Physician, Radiologist





- EKG Interpretation


Interpreted by ED Physician: Yes


Type: 12 lead EKG





- Scribe Statement


The provider has reviewed the documentation as recorded by the Casimiroibchristian Ojeda





Provider Scribe Attestation:


All medical record entries made by the Scribe were at my direction and 

personally dictated by me. I have reviewed the chart and agree that the record 

accurately reflects my personal performance of the history, physical exam, 

medical decision making, and the department course for this patient. I have also

 personally directed, reviewed, and agree with the discharge instructions and 

disposition.








Disposition/Present on Arrival





- Present on Arrival


Any Indicators Present on Arrival: No


History of DVT/PE: No


History of Uncontrolled Diabetes: No


Urinary Catheter: No


History of Decub. Ulcer: No


History Surgical Site Infection Following: None





- Disposition


Have Diagnosis and Disposition been Completed?: Yes


Diagnosis: 


 Near syncope, Fall (on) (from) other stairs and steps, initial encounter





Disposition: HOSPITALIZED


Disposition Time: 04:07


Patient Problems: 


                             Current Active Problems











Problem Status Onset


 


Fall (on) (from) other stairs and steps, initial encounter Acute 


 


Near syncope Acute 











Condition: STABLE

## 2018-12-14 NOTE — CARD
--------------- APPROVED REPORT --------------





Date of service: 12/13/2018



EKG Measurement

Heart [id number]

SD [id number]

[id number]

[id number]

[id number]



<Conclusion>

Sinus bradycardia

Otherwise normal ECG

## 2019-01-07 ENCOUNTER — HOSPITAL ENCOUNTER (EMERGENCY)
Dept: HOSPITAL 42 - ED | Age: 69
Discharge: HOME | End: 2019-01-07
Payer: MEDICARE

## 2019-01-07 VITALS
RESPIRATION RATE: 18 BRPM | DIASTOLIC BLOOD PRESSURE: 82 MMHG | HEART RATE: 68 BPM | SYSTOLIC BLOOD PRESSURE: 124 MMHG | OXYGEN SATURATION: 97 %

## 2019-01-07 VITALS — TEMPERATURE: 97.4 F

## 2019-01-07 VITALS — BODY MASS INDEX: 20.4 KG/M2

## 2019-01-07 DIAGNOSIS — F41.9: Primary | ICD-10-CM

## 2019-01-07 DIAGNOSIS — Z76.5: ICD-10-CM

## 2019-01-07 PROCEDURE — 99283 EMERGENCY DEPT VISIT LOW MDM: CPT

## 2019-01-07 PROCEDURE — 96372 THER/PROPH/DIAG INJ SC/IM: CPT

## 2019-01-07 NOTE — ED PDOC
Arrival/HPI





- General


Chief Complaint: Med Refill





Past Medical History





- Past History


Past History: No Previous





- Infectious Disease


Hx of Infectious Diseases: None





- Cardiac


Hx Cardiac Disorders: No





- Pulmonary


Hx Respiratory Disorders: No





- Neurological


Hx Neurological Disorder: No





- HEENT


Other/Comment: reading glasses





- Hematological/Oncological


Hx Blood Disorders: No


Other/Comment: left breast lumpectomy; benign





- Musculoskeletal/Rheumatological


Hx Falls: No


Hx Herniated Disk: Yes


Hx Osteoarthritis: Yes





- Genitourinary/Gynecological


Hx Sexually Transmitted Diseases: No





- Psychiatric


Hx Anxiety: Yes


Hx Panic Disorder: Yes


Hx Substance Use: No





- Surgical History


Hx Hysterectomy: Yes


Other/Comment: benign tumor removed left breast





- Anesthesia


Hx Anesthesia: Yes


Hx Anesthesia Reactions: No


Hx Malignant Hyperthermia: No





Family/Social History


Smoking Status: Heavy Smoker > 10 Cigarettes Daily


Hx Alcohol Use: Yes


Hx Substance Use: No





Allergies/Home Meds


Allergies/Adverse Reactions: 


Allergies





No Known Allergies Allergy (Verified 09/02/18 21:45)


   








Home Medications: 


                                    Home Meds











 Medication  Instructions  Recorded  Confirmed


 


clonazePAM [Klonopin] 3 tab PO BID 11/30/15 09/02/18


 


Citalopram Hydrobromide [Celexa] 40 mg PO DAILY 07/05/18 09/02/18


 


Cyclobenzaprine [Cyclobenzaprine 10 mg PO DAILY 07/05/18 09/02/18





HCl]   


 


diaZEpam [Valium] 10 mg PO TID 07/05/18 09/02/18














Physical Exam





Vital Signs











  Temp Pulse Resp BP Pulse Ox


 


 01/07/19 02:15  97.4 F L  74  20  127/88  95














Medical Decision Making





- Medication Orders


Current Medication Orders: 














Discontinued Medications





Alprazolam (Xanax)  0.5 mg PO STAT STA; Protocol


   Stop: 01/07/19 02:37


Ketorolac Tromethamine (Toradol)  60 mg IM STAT STA


   Stop: 01/07/19 03:00











Disposition/Present on Arrival





- Present on Arrival


Any Indicators Present on Arrival: No


History of DVT/PE: No


History of Uncontrolled Diabetes: No


Urinary Catheter: No


History of Decub. Ulcer: No


History Surgical Site Infection Following: None





- Disposition


Have Diagnosis and Disposition been Completed?: Yes


Diagnosis: 


 Anxiety, Malingering





Disposition: HOME/ ROUTINE


Disposition Time: 03:12


Patient Plan: Discharge


Condition: STABLE


Discharge Instructions (ExitCare):  Anxiety, Adult (DC)


Print Language: ENGLISH


Additional Instructions: 





All medical record entries made by the Scribe were at my direction and 

personally dictated by me. I have reviewed the chart and agree that the record 

accurately reflects my personal performance of the history, physical exam, 

medical decision making, and the department course for this patient. I have also

 personally directed, reviewed, and agree with the discharge instructions and 

disposition.


Referrals: 


Chato Pacheco MD [Non-Staff] - Follow up with primary

## 2019-01-15 ENCOUNTER — HOSPITAL ENCOUNTER (INPATIENT)
Dept: HOSPITAL 42 - ED | Age: 69
LOS: 3 days | Discharge: HOME | DRG: 948 | End: 2019-01-18
Attending: INTERNAL MEDICINE | Admitting: INTERNAL MEDICINE
Payer: MEDICARE

## 2019-01-15 VITALS — BODY MASS INDEX: 23.4 KG/M2

## 2019-01-15 DIAGNOSIS — M06.9: ICD-10-CM

## 2019-01-15 DIAGNOSIS — R41.0: Primary | ICD-10-CM

## 2019-01-15 DIAGNOSIS — G89.29: ICD-10-CM

## 2019-01-15 DIAGNOSIS — M54.9: ICD-10-CM

## 2019-01-15 DIAGNOSIS — F17.210: ICD-10-CM

## 2019-01-15 DIAGNOSIS — D72.829: ICD-10-CM

## 2019-01-15 DIAGNOSIS — F32.9: ICD-10-CM

## 2019-01-15 DIAGNOSIS — I10: ICD-10-CM

## 2019-01-15 DIAGNOSIS — E87.1: ICD-10-CM

## 2019-01-15 DIAGNOSIS — F22: ICD-10-CM

## 2019-01-15 DIAGNOSIS — F41.9: ICD-10-CM

## 2019-01-15 LAB
ALBUMIN SERPL-MCNC: 4.7 G/DL (ref 3–4.8)
ALBUMIN/GLOB SERPL: 1.7 {RATIO} (ref 1.1–1.8)
ALT SERPL-CCNC: 26 U/L (ref 7–56)
APAP SERPL-MCNC: < 10 UG/ML (ref 10–20)
APPEARANCE UR: CLEAR
AST SERPL-CCNC: 20 U/L (ref 14–36)
BACTERIA #/AREA URNS HPF: (no result) /HPF
BASOPHILS # BLD AUTO: 0.04 K/MM3 (ref 0–2)
BASOPHILS NFR BLD: 0.4 % (ref 0–3)
BILIRUB UR-MCNC: (no result) MG/DL
BUN SERPL-MCNC: 12 MG/DL (ref 7–21)
CALCIUM SERPL-MCNC: 10.6 MG/DL (ref 8.4–10.5)
COLOR UR: YELLOW
EOSINOPHIL # BLD: 0 10*3/UL (ref 0–0.7)
EOSINOPHIL NFR BLD: 0.2 % (ref 1.5–5)
ERYTHROCYTE [DISTWIDTH] IN BLOOD BY AUTOMATED COUNT: 13 % (ref 11.5–14.5)
GFR NON-AFRICAN AMERICAN: > 60
GLUCOSE UR STRIP-MCNC: NEGATIVE MG/DL
GRANULOCYTES # BLD: 6.41 10*3/UL (ref 1.4–6.5)
GRANULOCYTES NFR BLD: 63.3 % (ref 50–68)
HGB BLD-MCNC: 12.6 G/DL (ref 12–16)
LEUKOCYTE ESTERASE UR-ACNC: NEGATIVE LEU/UL
LYMPHOCYTES # BLD: 2.9 10*3/UL (ref 1.2–3.4)
LYMPHOCYTES NFR BLD AUTO: 28.8 % (ref 22–35)
MCH RBC QN AUTO: 31.1 PG (ref 25–35)
MCHC RBC AUTO-ENTMCNC: 34.7 G/DL (ref 31–37)
MCV RBC AUTO: 89.6 FL (ref 80–105)
MONOCYTES # BLD AUTO: 0.7 10*3/UL (ref 0.1–0.6)
MONOCYTES NFR BLD: 7.3 % (ref 1–6)
PH UR STRIP: 6 [PH] (ref 4.7–8)
PLATELET # BLD: 526 10^3/UL (ref 120–450)
PMV BLD AUTO: 8.6 FL (ref 7–11)
PROT UR STRIP-MCNC: NEGATIVE MG/DL
RBC # BLD AUTO: 4.05 10^6/UL (ref 3.5–6.1)
RBC # UR STRIP: (no result) /UL
SALICYLATES SERPL-MCNC: < 1 MG/DL (ref 2–20)
SP GR UR STRIP: >= 1.03 (ref 1–1.03)
UROBILINOGEN UR STRIP-ACNC: 0.2 E.U./DL
WBC # BLD AUTO: 10.1 10^3/UL (ref 4.5–11)
WBC #/AREA URNS HPF: (no result) /HPF (ref 0–6)

## 2019-01-15 NOTE — CP.PCM.HP
<ChepeBarriga - Last Filed: 01/15/19 20:59>





History of Present Illness





- History of Present Illness


History of Present Illness: 





Nithya oM, PGY-1 Medicine H&P Note for Dr. Pelaez:


CC: AMS


Pt is a 70 yo F with pmhx of rheumatoid arthritis, chronic back pain, anxiety, 

HTN and depression who presents to the ED for AMS. Pt is actively confused, 

tangential and is having delusions of people having body parts and heads that 

are not matching. Pt also claims that her meds have run out but when confirmed 

by pharmacy, and by looking at recent visits she was here for a similar problem 

and had her medication filled and picked up within the past week. Pts PMD, Marci Robin was called and stated that this was something new for the pt. 

Further HPI and ROS could not be obtained due to pts current confused and 

agitated state. Other Hx was obtained through chart review. At this time Pt den

ies SI/HI.





Pmhx: Rheumatoid arthritis, chronic back pain, anxiety, HTN and depression


Pshx: L breast benign tumor


Meds: Celexa 40 po qd, flexeril 10qd, neurotin 100 BID, percocet , 

restoril 30, Xanax 2 BID


All: NKDA


Social: Unable to obtain


Fam: Unable to obtain


PMD: Marci Zapata


Psych: Dr. Chato Ugalde


Pharm: [facility]








Present on Admission





- Present on Admission


Any Indicators Present on Admission: No





Review of Systems





- Review of Systems


Systems not reviewed;Unavailable: Altered Mental Status





Past Patient History





- Infectious Disease


Hx of Infectious Diseases: None





- Past Social History


Smoking Status: Heavy Smoker > 10 Cigarettes Daily





- CARDIAC


Hx Cardiac Disorders: No





- PULMONARY


Hx Respiratory Disorders: No





- NEUROLOGICAL


Hx Neurological Disorder: No





- HEENT


Other/Comment: reading glasses





- HEMATOLOGICAL/ONCOLOGICAL


Hx Blood Disorders: No


Other/Comment: left breast lumpectomy; benign





- MUSCULOSKELETAL/RHEUMATOLOGICAL


Hx Falls: No


Hx Herniated Disk: Yes


Hx Osteoarthritis: Yes





- GENITOURINARY/GYNECOLOGICAL


Hx Sexually Transmitted Disorders: No





- PSYCHIATRIC


Hx Anxiety: Yes


Hx Panic Symptoms: Yes


Hx Substance Use: No





- SURGICAL HISTORY


Hx Hysterectomy: Yes


Other/Comment: benign tumor removed left breast





- ANESTHESIA


Hx Anesthesia: Yes


Hx Anesthesia Reactions: No


Hx Malignant Hyperthermia: No





Meds


Allergies/Adverse Reactions: 


                                    Allergies











Allergy/AdvReac Type Severity Reaction Status Date / Time


 


No Known Allergies Allergy   Verified 09/02/18 21:45














Physical Exam





- Constitutional


Appears: Non-toxic, No Acute Distress, Agitated, Confused





- Head Exam


Head Exam: ATRAUMATIC, NORMAL INSPECTION, NORMOCEPHALIC





- Eye Exam


Eye Exam: EOMI, Normal appearance, PERRL





- Respiratory Exam


Respiratory Exam: Clear to Auscultation Bilateral, NORMAL BREATHING PATTERN.  

absent: Accessory Muscle Use, Rales, Rhonchi, Wheezes, Respiratory Distress, 

Stridor





- Cardiovascular Exam


Cardiovascular Exam: RRR, +S1, +S2.  absent: Gallop, Rubs





- GI/Abdominal Exam


GI & Abdominal Exam: Normal Bowel Sounds, Soft.  absent: Distended, Firm, 

Guarding, Tenderness





- Extremities Exam


Extremities exam: Positive for: normal inspection.  Negative for: calf 

tenderness, pedal edema





- Back Exam


Back exam: NORMAL INSPECTION.  absent: CVA tenderness (L), CVA tenderness (R)





- Neurological Exam


Neurological exam: Altered


Additional comments: 





Pt refused to comply with neuro exam, no facial droop or slurred speech noted, 

pt was able to move all extremities freely without limitation of pain or 

weakness





- Psychiatric Exam


Psychiatric exam: Agitated


Additional comments: 





Denies SI/HI





- Skin


Skin Exam: Dry, Normal Color, Warm





Results





- Vital Signs


Recent Vital Signs: 





                                Last Vital Signs











Temp  98.1 F   01/15/19 12:13


 


Pulse  73   01/15/19 14:12


 


Resp  18   01/15/19 14:12


 


BP  163/81 H  01/15/19 16:16


 


Pulse Ox  98   01/15/19 14:12














- Labs


Result Diagrams: 


                                 01/15/19 13:40





                                 01/15/19 13:40


Labs: 





                         Laboratory Results - last 24 hr











  01/15/19 01/15/19 01/15/19





  13:40 13:40 13:40


 


WBC  10.1  


 


RBC  4.05  


 


Hgb  12.6  


 


Hct  36.3  


 


MCV  89.6  


 


MCH  31.1  


 


MCHC  34.7  


 


RDW  13.0  


 


Plt Count  526 H  


 


MPV  8.6  


 


Gran %  63.3  


 


Lymph % (Auto)  28.8  


 


Mono % (Auto)  7.3 H  


 


Eos % (Auto)  0.2 L  


 


Baso % (Auto)  0.4  


 


Gran #  6.41  


 


Lymph # (Auto)  2.9  


 


Mono # (Auto)  0.7 H  


 


Eos # (Auto)  0.0  


 


Baso # (Auto)  0.04  


 


Sodium   132 


 


Potassium   4.3 


 


Chloride   99 


 


Carbon Dioxide   25 


 


Anion Gap   12 


 


BUN   12 


 


Creatinine   0.5 L 


 


Est GFR ( Amer)   > 60 


 


Est GFR (Non-Af Amer)   > 60 


 


Random Glucose   95 


 


Calcium   10.6 H 


 


Magnesium   1.9 


 


Total Bilirubin   0.6 


 


AST   20 


 


ALT   26 


 


Alkaline Phosphatase   55 


 


Ammonia   


 


Total Protein   7.5 


 


Albumin   4.7 


 


Globulin   2.8 


 


Albumin/Globulin Ratio   1.7 


 


Urine Color   


 


Urine Appearance   


 


Urine pH   


 


Ur Specific Gravity   


 


Urine Protein   


 


Urine Glucose (UA)   


 


Urine Ketones   


 


Urine Blood   


 


Urine Nitrate   


 


Urine Bilirubin   


 


Urine Urobilinogen   


 


Ur Leukocyte Esterase   


 


Urine RBC   


 


Urine WBC   


 


Ur Epithelial Cells   


 


Urine Bacteria   


 


Salicylates   


 


Urine Opiates Screen   


 


Urine Methadone Screen   


 


Acetaminophen   


 


Ur Barbiturates Screen   


 


Ur Phencyclidine Scrn   


 


Ur Amphetamines Screen   


 


U Benzodiazepines Scrn   


 


U Oth Cocaine Metabols   


 


U Cannabinoids Screen   


 


Alcohol, Quantitative    < 10














  01/15/19 01/15/19 01/15/19





  16:59 16:59 17:49


 


WBC   


 


RBC   


 


Hgb   


 


Hct   


 


MCV   


 


MCH   


 


MCHC   


 


RDW   


 


Plt Count   


 


MPV   


 


Gran %   


 


Lymph % (Auto)   


 


Mono % (Auto)   


 


Eos % (Auto)   


 


Baso % (Auto)   


 


Gran #   


 


Lymph # (Auto)   


 


Mono # (Auto)   


 


Eos # (Auto)   


 


Baso # (Auto)   


 


Sodium   


 


Potassium   


 


Chloride   


 


Carbon Dioxide   


 


Anion Gap   


 


BUN   


 


Creatinine   


 


Est GFR ( Amer)   


 


Est GFR (Non-Af Amer)   


 


Random Glucose   


 


Calcium   


 


Magnesium   


 


Total Bilirubin   


 


AST   


 


ALT   


 


Alkaline Phosphatase   


 


Ammonia    < 9 L


 


Total Protein   


 


Albumin   


 


Globulin   


 


Albumin/Globulin Ratio   


 


Urine Color  Yellow  


 


Urine Appearance  Clear  


 


Urine pH  6.0  


 


Ur Specific Gravity  >= 1.030  


 


Urine Protein  Negative  


 


Urine Glucose (UA)  Negative  


 


Urine Ketones  15 H  


 


Urine Blood  Trace-intact H  


 


Urine Nitrate  Negative  


 


Urine Bilirubin  Small H  


 


Urine Urobilinogen  0.2  


 


Ur Leukocyte Esterase  Negative  


 


Urine RBC  1 - 3 H  


 


Urine WBC  0 - 2  


 


Ur Epithelial Cells  3 - 4  


 


Urine Bacteria  Few  


 


Salicylates   


 


Urine Opiates Screen   Negative 


 


Urine Methadone Screen   Negative 


 


Acetaminophen   


 


Ur Barbiturates Screen   Negative 


 


Ur Phencyclidine Scrn   Negative 


 


Ur Amphetamines Screen   Negative 


 


U Benzodiazepines Scrn   Positive H 


 


U Oth Cocaine Metabols   Negative 


 


U Cannabinoids Screen   Positive H 


 


Alcohol, Quantitative   














  01/15/19





  17:57


 


WBC 


 


RBC 


 


Hgb 


 


Hct 


 


MCV 


 


MCH 


 


MCHC 


 


RDW 


 


Plt Count 


 


MPV 


 


Gran % 


 


Lymph % (Auto) 


 


Mono % (Auto) 


 


Eos % (Auto) 


 


Baso % (Auto) 


 


Gran # 


 


Lymph # (Auto) 


 


Mono # (Auto) 


 


Eos # (Auto) 


 


Baso # (Auto) 


 


Sodium 


 


Potassium 


 


Chloride 


 


Carbon Dioxide 


 


Anion Gap 


 


BUN 


 


Creatinine 


 


Est GFR ( Amer) 


 


Est GFR (Non-Af Amer) 


 


Random Glucose 


 


Calcium 


 


Magnesium 


 


Total Bilirubin 


 


AST 


 


ALT 


 


Alkaline Phosphatase 


 


Ammonia 


 


Total Protein 


 


Albumin 


 


Globulin 


 


Albumin/Globulin Ratio 


 


Urine Color 


 


Urine Appearance 


 


Urine pH 


 


Ur Specific Gravity 


 


Urine Protein 


 


Urine Glucose (UA) 


 


Urine Ketones 


 


Urine Blood 


 


Urine Nitrate 


 


Urine Bilirubin 


 


Urine Urobilinogen 


 


Ur Leukocyte Esterase 


 


Urine RBC 


 


Urine WBC 


 


Ur Epithelial Cells 


 


Urine Bacteria 


 


Salicylates  < 1 L


 


Urine Opiates Screen 


 


Urine Methadone Screen 


 


Acetaminophen  < 10.0 L


 


Ur Barbiturates Screen 


 


Ur Phencyclidine Scrn 


 


Ur Amphetamines Screen 


 


U Benzodiazepines Scrn 


 


U Oth Cocaine Metabols 


 


U Cannabinoids Screen 


 


Alcohol, Quantitative 














Assessment & Plan





- Assessment and Plan (Free Text)


Assessment: 





Pt is a 70 yo F with pmhx of rheumatoid arthritis, chronic back pain, anxiety, 

HTN and depression who presents to the ED for AMS. Head CT was negative for 

intracranial path, CXR and UA was also negative. 


Plan: 





1) AMS


- 2/2 Delerium vs Psychosis, less likely infectious due to pt being afebrile, no

WBC count and UA and CXR are negative for infectious process


- Pt sees Dr. Chato Ugalde as her psychiatrist 


- Per pts pharmacy she just had her celexa picked up on 1/10/19


- Psychiatry consult


- f/u ammonia level


- UDS





2) Hx of Chronic Back Pain:


- Hold flexeril and Percocet since pt is not complaining of pain at this time





3) Hx of HTN


- Cont pts home lopression now and lisinopril in AM 





4) Hx of Anxiety:


- Switched pts xanax from 2mg scheduled to 2 mg PRN 





PPX:


DVT: SCDs


Regular Diet





Case Seen and discussed with Dr. Latanya Mo, PGY-1





<Harriet Pelaez - Last Filed: 01/16/19 08:16>





Results





- Vital Signs


Recent Vital Signs: 





                                Last Vital Signs











Temp  98.0 F   01/15/19 18:43


 


Pulse  90   01/15/19 19:21


 


Resp  20   01/15/19 22:28


 


BP  173/88 H  01/15/19 19:21


 


Pulse Ox  100   01/15/19 18:43














- Labs


Result Diagrams: 


                                 01/16/19 07:00





                                 01/16/19 07:00


Labs: 





                         Laboratory Results - last 24 hr











  01/15/19 01/15/19 01/15/19





  13:40 13:40 13:40


 


WBC  10.1  


 


RBC  4.05  


 


Hgb  12.6  


 


Hct  36.3  


 


MCV  89.6  


 


MCH  31.1  


 


MCHC  34.7  


 


RDW  13.0  


 


Plt Count  526 H  


 


MPV  8.6  


 


Gran %  63.3  


 


Lymph % (Auto)  28.8  


 


Mono % (Auto)  7.3 H  


 


Eos % (Auto)  0.2 L  


 


Baso % (Auto)  0.4  


 


Gran #  6.41  


 


Lymph # (Auto)  2.9  


 


Mono # (Auto)  0.7 H  


 


Eos # (Auto)  0.0  


 


Baso # (Auto)  0.04  


 


Sodium   132 


 


Potassium   4.3 


 


Chloride   99 


 


Carbon Dioxide   25 


 


Anion Gap   12 


 


BUN   12 


 


Creatinine   0.5 L 


 


Est GFR ( Amer)   > 60 


 


Est GFR (Non-Af Amer)   > 60 


 


Random Glucose   95 


 


Calcium   10.6 H 


 


Magnesium   1.9 


 


Total Bilirubin   0.6 


 


AST   20 


 


ALT   26 


 


Alkaline Phosphatase   55 


 


Ammonia   


 


Total Protein   7.5 


 


Albumin   4.7 


 


Globulin   2.8 


 


Albumin/Globulin Ratio   1.7 


 


TSH 3rd Generation   


 


Urine Color   


 


Urine Appearance   


 


Urine pH   


 


Ur Specific Gravity   


 


Urine Protein   


 


Urine Glucose (UA)   


 


Urine Ketones   


 


Urine Blood   


 


Urine Nitrate   


 


Urine Bilirubin   


 


Urine Urobilinogen   


 


Ur Leukocyte Esterase   


 


Urine RBC   


 


Urine WBC   


 


Ur Epithelial Cells   


 


Urine Bacteria   


 


Salicylates   


 


Urine Opiates Screen   


 


Urine Methadone Screen   


 


Acetaminophen   


 


Ur Barbiturates Screen   


 


Ur Phencyclidine Scrn   


 


Ur Amphetamines Screen   


 


U Benzodiazepines Scrn   


 


U Oth Cocaine Metabols   


 


U Cannabinoids Screen   


 


Alcohol, Quantitative    < 10














  01/15/19 01/15/19 01/15/19





  16:59 16:59 17:49


 


WBC   


 


RBC   


 


Hgb   


 


Hct   


 


MCV   


 


MCH   


 


MCHC   


 


RDW   


 


Plt Count   


 


MPV   


 


Gran %   


 


Lymph % (Auto)   


 


Mono % (Auto)   


 


Eos % (Auto)   


 


Baso % (Auto)   


 


Gran #   


 


Lymph # (Auto)   


 


Mono # (Auto)   


 


Eos # (Auto)   


 


Baso # (Auto)   


 


Sodium   


 


Potassium   


 


Chloride   


 


Carbon Dioxide   


 


Anion Gap   


 


BUN   


 


Creatinine   


 


Est GFR ( Amer)   


 


Est GFR (Non-Af Amer)   


 


Random Glucose   


 


Calcium   


 


Magnesium   


 


Total Bilirubin   


 


AST   


 


ALT   


 


Alkaline Phosphatase   


 


Ammonia    < 9 L


 


Total Protein   


 


Albumin   


 


Globulin   


 


Albumin/Globulin Ratio   


 


TSH 3rd Generation   


 


Urine Color  Yellow  


 


Urine Appearance  Clear  


 


Urine pH  6.0  


 


Ur Specific Gravity  >= 1.030  


 


Urine Protein  Negative  


 


Urine Glucose (UA)  Negative  


 


Urine Ketones  15 H  


 


Urine Blood  Trace-intact H  


 


Urine Nitrate  Negative  


 


Urine Bilirubin  Small H  


 


Urine Urobilinogen  0.2  


 


Ur Leukocyte Esterase  Negative  


 


Urine RBC  1 - 3 H  


 


Urine WBC  0 - 2  


 


Ur Epithelial Cells  3 - 4  


 


Urine Bacteria  Few  


 


Salicylates   


 


Urine Opiates Screen   Negative 


 


Urine Methadone Screen   Negative 


 


Acetaminophen   


 


Ur Barbiturates Screen   Negative 


 


Ur Phencyclidine Scrn   Negative 


 


Ur Amphetamines Screen   Negative 


 


U Benzodiazepines Scrn   Positive H 


 


U Oth Cocaine Metabols   Negative 


 


U Cannabinoids Screen   Positive H 


 


Alcohol, Quantitative   














  01/15/19 01/16/19 01/16/19





  17:57 07:00 07:00


 


WBC   11.7 H 


 


RBC   3.61 


 


Hgb   11.2 L 


 


Hct   32.2 L 


 


MCV   89.2 


 


MCH   31.0 


 


MCHC   34.8 


 


RDW   12.8 


 


Plt Count   430 


 


MPV   8.1 


 


Gran %   50.6 


 


Lymph % (Auto)   41.5 H 


 


Mono % (Auto)   7.2 H 


 


Eos % (Auto)   0.3 L 


 


Baso % (Auto)   0.4 


 


Gran #   5.94 


 


Lymph # (Auto)   4.9 H 


 


Mono # (Auto)   0.9 H 


 


Eos # (Auto)   0.0 


 


Baso # (Auto)   0.05 


 


Sodium    127 L


 


Potassium    4.0


 


Chloride    94 L


 


Carbon Dioxide    24


 


Anion Gap    13


 


BUN    15


 


Creatinine    0.7


 


Est GFR ( Amer)    > 60


 


Est GFR (Non-Af Amer)    > 60


 


Random Glucose    74


 


Calcium    9.8


 


Magnesium   


 


Total Bilirubin    0.6


 


AST    27


 


ALT    27


 


Alkaline Phosphatase    52


 


Ammonia   


 


Total Protein    7.1


 


Albumin    4.3


 


Globulin    2.7


 


Albumin/Globulin Ratio    1.6


 


TSH 3rd Generation   


 


Urine Color   


 


Urine Appearance   


 


Urine pH   


 


Ur Specific Gravity   


 


Urine Protein   


 


Urine Glucose (UA)   


 


Urine Ketones   


 


Urine Blood   


 


Urine Nitrate   


 


Urine Bilirubin   


 


Urine Urobilinogen   


 


Ur Leukocyte Esterase   


 


Urine RBC   


 


Urine WBC   


 


Ur Epithelial Cells   


 


Urine Bacteria   


 


Salicylates  < 1 L  


 


Urine Opiates Screen   


 


Urine Methadone Screen   


 


Acetaminophen  < 10.0 L  


 


Ur Barbiturates Screen   


 


Ur Phencyclidine Scrn   


 


Ur Amphetamines Screen   


 


U Benzodiazepines Scrn   


 


U Oth Cocaine Metabols   


 


U Cannabinoids Screen   


 


Alcohol, Quantitative   














  01/16/19





  07:00


 


WBC 


 


RBC 


 


Hgb 


 


Hct 


 


MCV 


 


MCH 


 


MCHC 


 


RDW 


 


Plt Count 


 


MPV 


 


Gran % 


 


Lymph % (Auto) 


 


Mono % (Auto) 


 


Eos % (Auto) 


 


Baso % (Auto) 


 


Gran # 


 


Lymph # (Auto) 


 


Mono # (Auto) 


 


Eos # (Auto) 


 


Baso # (Auto) 


 


Sodium 


 


Potassium 


 


Chloride 


 


Carbon Dioxide 


 


Anion Gap 


 


BUN 


 


Creatinine 


 


Est GFR ( Amer) 


 


Est GFR (Non-Af Amer) 


 


Random Glucose 


 


Calcium 


 


Magnesium 


 


Total Bilirubin 


 


AST 


 


ALT 


 


Alkaline Phosphatase 


 


Ammonia 


 


Total Protein 


 


Albumin 


 


Globulin 


 


Albumin/Globulin Ratio 


 


TSH 3rd Generation  0.58


 


Urine Color 


 


Urine Appearance 


 


Urine pH 


 


Ur Specific Gravity 


 


Urine Protein 


 


Urine Glucose (UA) 


 


Urine Ketones 


 


Urine Blood 


 


Urine Nitrate 


 


Urine Bilirubin 


 


Urine Urobilinogen 


 


Ur Leukocyte Esterase 


 


Urine RBC 


 


Urine WBC 


 


Ur Epithelial Cells 


 


Urine Bacteria 


 


Salicylates 


 


Urine Opiates Screen 


 


Urine Methadone Screen 


 


Acetaminophen 


 


Ur Barbiturates Screen 


 


Ur Phencyclidine Scrn 


 


Ur Amphetamines Screen 


 


U Benzodiazepines Scrn 


 


U Oth Cocaine Metabols 


 


U Cannabinoids Screen 


 


Alcohol, Quantitative 














Attending/Attestation





- Attestation


I have personally seen and examined this patient.: Yes


I have fully participated in the care of the patient.: Yes


I have reviewed all pertinent clinical information: Yes


Notes (Text): 





01/15/19 


69 year old female with past medical history of rheumatoid arthritis, anxiety, 

depression and hypertension who presents with AMS/delirium.  CT head was 

negative.  UA not suggestive of UTI.  Urine drug screen positive for 

benzodiazepines and cannabinoids.  Psychiatry evaluation is requested.  Continue

with home medications for hypertension.





Harriet Pelaez MD


Hospitalist.

## 2019-01-15 NOTE — RAD
Date of service: 



01/15/2019



HISTORY:

 chest pain 



COMPARISON:

07/05/2018. 



FINDINGS:



LUNGS:

The lungs are well inflated and clear.



PLEURA:

No pleural effusions or pneumothorax. 



CARDIOVASCULAR:

The heart is normal in size.  No aortic atherosclerotic 

calcifications present. 



OSSEOUS STRUCTURES:

Within normal limits for the patient's age.



VISUALIZED UPPER ABDOMEN:

Normal.



OTHER FINDINGS:

None.



IMPRESSION:

No active pulmonary disease.

## 2019-01-15 NOTE — ED PDOC
Arrival/HPI





- General


Chief Complaint: Psychiatric Evaluation


Time Seen by Provider: 01/15/19 12:07


Historian: Patient





- History of Present Illness


Narrative History of Present Illness (Text): 





01/15/19 12:22


A 33 year old female, whose past medical history includes rheumatoid arthritis, 

chronic back pain, anxiety, and depression, presents to the emergency department

for altered mental status. Patient is speaking in tangents and in nonsensical 

answers saying things such as I hope those people are okay, their hands are 

hurt". HPI and ROS are limited due to patient's confused state.





PMD: Marci Robin





Time/Duration: 4-6 hours


Symptom Onset: Gradual


Symptom Course: Unchanged


Activities at Onset: Light


Context: Home





Past Medical History





- Provider Review


Nursing Documentation Reviewed: Yes





- Past History


Past History: No Previous





- Infectious Disease


Hx of Infectious Diseases: None





- Reproductive


Menopause: Yes





- Cardiac


Hx Cardiac Disorders: No





- Pulmonary


Hx Respiratory Disorders: No





- Neurological


Hx Neurological Disorder: No





- HEENT


Other/Comment: reading glasses





- Hematological/Oncological


Hx Blood Disorders: No


Other/Comment: left breast lumpectomy; benign





- Musculoskeletal/Rheumatological


Hx Falls: No


Hx Herniated Disk: Yes


Hx Osteoarthritis: Yes





- Genitourinary/Gynecological


Hx Sexually Transmitted Diseases: No





- Psychiatric


Hx Anxiety: Yes


Hx Panic Disorder: Yes


Hx Substance Use: No





- Surgical History


Hx Hysterectomy: Yes


Other/Comment: benign tumor removed left breast





- Anesthesia


Hx Anesthesia: Yes


Hx Anesthesia Reactions: No


Hx Malignant Hyperthermia: No





Family/Social History





- Physician Review


Nursing Documentation Reviewed: Yes


Family/Social History: No Known Family HX


Smoking Status: Heavy Smoker > 10 Cigarettes Daily


Hx Alcohol Use: Yes


Hx Substance Use: No





Allergies/Home Meds


Allergies/Adverse Reactions: 


Allergies





No Known Allergies Allergy (Verified 09/02/18 21:45)


   








Home Medications: 


                                    Home Meds











 Medication  Instructions  Recorded  Confirmed


 


Citalopram Hydrobromide [Celexa] 40 mg PO DAILY 07/05/18 01/15/19


 


Cyclobenzaprine [Cyclobenzaprine 10 mg PO DAILY 07/05/18 01/15/19





HCl]   


 


Alprazolam [Xanax] 2 mg PO BID 01/15/19 01/15/19


 


Gabapentin [Neurontin] 100 mg PO BID 01/15/19 01/15/19


 


Oxycodone HCl/Acetaminophen 10 - 325 mg PO BID PRN 01/15/19 01/15/19





[Percocet  mg Tablet]   


 


Temazepam [Restoril] 30 mg PO HS 01/15/19 01/15/19














Review of Systems





- Review of Systems


Systems not reviewed;Unavailable: Altered Mental Status (Due to confused state)





Physical Exam





- Physical Exam


Narrative Physical Exam (Text): 





01/15/19 12:22


Gen: VS reviewed, well developed, well nourished, nontoxic, mild distress, 

confused, frail appearing.


ENT: normal pharynx.


Eye: EOMI, PERRL.


Neck: no JVD, supple, no adenopathy.


CV: regular rate, regular rhythm, no rubs, no murmur, no gallops, S1, S2, pulses

 equal and strong.


Pulm: no distress, clear to auscultation, no wheeze, no rhonchi, breath sounds e

qual, no rales.


Abd: soft, nontender, no guarding, no rebound, no rigidity, normal bowel sounds.


Ext: no edema.


Skin: good color, no rash, no cyanosis.


Psych: responds appropriately to questions, normal affect.


Neuro: Confused, disoriented to time, situation, and place, CN2-12 intact 

grossly, motor intact, sensation intact.





Vital Signs Reviewed: Yes





Vital Signs











  Temp Pulse Resp BP Pulse Ox


 


 01/15/19 12:13  98.1 F  68  18  152/89 H  98











Temperature: Afebrile


Blood Pressure: Hypertensive


Pulse: Regular


Respiratory Rate: Normal





Medical Decision Making


ED Course and Treatment: 





01/15/19 12:22


Impression: 69 year old presenting to the emergency department for altered 

mental status. 





Plan:


-- Head CT without contrast


-- EKG


-- Labs


-- CBC


-- Chest X-Ray 


-- Urine culture


-- Urinalysis 


-- Reassess and disposition





Prior Visits:


Notes and results from previous visits were reviewed.  





Progress Notes:





01/15/19 17:09


admit accepted by dr. leach to the hospitalist service. patient was seen by P[facility] 

and the pcp states that recently and it was reported that the patient's mental 

status was "normal" and the current mental status is new-in other words, 

delirium. it was recommended to admit to medicine.


01/15/19 17:16








- RAD Interpretation


Narrative RAD Interpretations (Text): 





01/15/19 14:37


Procedure:  Head CT without contrast


Dictator: Dawna Hernandez


Impression: No acute intracranial abnormality





Procedure:   Chest X-ray 


Dictator: Dawna Hernandez


Impression: No active pulmonary disease.





: Radiologist





- EKG Interpretation


EKG Interpretation (Text): 





01/15/19 14:32


EKG: Ordered, reviewed, and independently interpreted the EKG.


Rate : 82 BPM


Rhythm : NSR


Interpretation : Normal QRS, normal axis, no acute ST-T wave abnormalities.





Interpreted by ED Physician: Yes





- Scribe Statement


The provider has reviewed the documentation as recorded by the Patrica Dempsey





All medical record entries made by the Patrica were at my direction and 

personally dictated by me. I have reviewed the chart and agree that the record 

accurately reflects my personal performance of the history, physical exam, 

medical decision making, and the department course for this patient. I have also

 personally directed, reviewed, and agree with the discharge instructions and 

disposition.








Disposition/Present on Arrival





- Present on Arrival


Any Indicators Present on Arrival: No


History of DVT/PE: No


History of Uncontrolled Diabetes: No


Urinary Catheter: No


History of Decub. Ulcer: No


History Surgical Site Infection Following: None





- Disposition


Have Diagnosis and Disposition been Completed?: Yes


Diagnosis: 


 Delirium





Disposition: HOSPITALIZED


Disposition Time: 17:17


Patient Plan: Admission


Patient Problems: 


                             Current Active Problems











Problem Status Onset


 


Delirium Acute 











Condition: STABLE


Forms:  Prediculous (English)

## 2019-01-15 NOTE — CT
Date of service: 



01/15/2019



PROCEDURE:  CT HEAD WITHOUT CONTRAST.



HISTORY:

altered mentation



COMPARISON:

12/13/2018.



TECHNIQUE:

Axial computed tomography images were obtained through the head/brain 

without intravenous contrast.  



Radiation dose:



Total exam DLP = 745.15 mGy-cm.



This CT exam was performed using one or more of the following dose 

reduction techniques: Automated exposure control, adjustment of the 

mA and/or kV according to patient size, and/or use of iterative 

reconstruction technique.



FINDINGS:



HEMORRHAGE:

No intracranial hemorrhage. 



BRAIN:

Gray-white matter differentiation is preserved.  There is no mass, 

mass effect or abnormal extra-axial fluid collection.  There is no 

territorial infarction. The midline sagittal structures are normal.



VENTRICLES:

There is mild age-related global parenchymal volume loss and 

proportionate enlargement of the ventricles and cortical sulci. 



CALVARIUM:

There is no calvarial fracture or extracranial soft tissue swelling.



PARANASAL SINUSES:

Predominantly clear.



MASTOID AIR CELLS:

Predominantly clear.



OTHER FINDINGS:

None.



IMPRESSION:

No acute intracranial abnormality.

## 2019-01-16 LAB
ALBUMIN SERPL-MCNC: 4.3 G/DL (ref 3–4.8)
ALBUMIN/GLOB SERPL: 1.6 {RATIO} (ref 1.1–1.8)
ALT SERPL-CCNC: 27 U/L (ref 7–56)
AST SERPL-CCNC: 27 U/L (ref 14–36)
BASOPHILS # BLD AUTO: 0.05 K/MM3 (ref 0–2)
BASOPHILS NFR BLD: 0.4 % (ref 0–3)
BUN SERPL-MCNC: 15 MG/DL (ref 7–21)
BUN SERPL-MCNC: 16 MG/DL (ref 7–21)
CALCIUM SERPL-MCNC: 9.7 MG/DL (ref 8.4–10.5)
CALCIUM SERPL-MCNC: 9.8 MG/DL (ref 8.4–10.5)
EOSINOPHIL # BLD: 0 10*3/UL (ref 0–0.7)
EOSINOPHIL NFR BLD: 0.3 % (ref 1.5–5)
ERYTHROCYTE [DISTWIDTH] IN BLOOD BY AUTOMATED COUNT: 12.8 % (ref 11.5–14.5)
GFR NON-AFRICAN AMERICAN: > 60
GFR NON-AFRICAN AMERICAN: > 60
GRANULOCYTES # BLD: 5.94 10*3/UL (ref 1.4–6.5)
GRANULOCYTES NFR BLD: 50.6 % (ref 50–68)
HGB BLD-MCNC: 11.2 G/DL (ref 12–16)
LYMPHOCYTES # BLD: 4.9 10*3/UL (ref 1.2–3.4)
LYMPHOCYTES NFR BLD AUTO: 41.5 % (ref 22–35)
MCH RBC QN AUTO: 31 PG (ref 25–35)
MCHC RBC AUTO-ENTMCNC: 34.8 G/DL (ref 31–37)
MCV RBC AUTO: 89.2 FL (ref 80–105)
MONOCYTES # BLD AUTO: 0.9 10*3/UL (ref 0.1–0.6)
MONOCYTES NFR BLD: 7.2 % (ref 1–6)
PLATELET # BLD: 430 10^3/UL (ref 120–450)
PMV BLD AUTO: 8.1 FL (ref 7–11)
RBC # BLD AUTO: 3.61 10^6/UL (ref 3.5–6.1)
WBC # BLD AUTO: 11.7 10^3/UL (ref 4.5–11)

## 2019-01-16 NOTE — CARD
--------------- APPROVED REPORT --------------





Date of service: 01/15/2019



EKG Measurement

Heart [id number]

[id number]

[id number]

[id number]

[id number]



<Conclusion>

Normal sinus rhythm

Normal ECG

## 2019-01-16 NOTE — CON
DATE:  01/16/2019



HISTORY OF PRESENT ILLNESS:  The patient is a 69-year-old  female.

The patient has a history of anxiety and depression.  The patient was

admitted to the medical site for evaluation of altered mental status, psych

consult was called because the patient had psychotic, delusional and

restless.  The patient was seen and examined today. The patient was

very confused, restless, and the patient was "fighting with zombies overnight". 
The patient

presented to be confused,  The patient obviously is paranoid and psychotic.

The patient was talking that people are talking about her, obviously nobody

was talking about her and was doing the regular routine distribution of the

medication.  The patient was making remarks at us, because "they chopped body 
parts

and they are stealing my medications."  Obviously, nobody "chop" any

body parts and nobody is stealing medications.  The patient is psychotic,

disorganized, and delusional.



PHYSICAL EXAMINATION:

VITAL SIGNS:  Stable.  Temperature 98.0, pulse is 98, blood pressure

148/119, respirations 18, and oxygen saturation is 100.

MENTAL STATUS EXAMINATION:  The patient appears to be alert.  The patient

does not know where she is.  The patient is psychotic and delusional.  The

patient is not able to describe her mood.  Thought process is

circumstantial, tangential, psychotic, and disorganized.  Thought content,

the patient obviously hallucinating, paranoid and disorganized.  Insight

and judgment seems to be impaired.  Impulses are unpredictable.



MEDICATIONS:  Reviewed.  The patient is on Xanax 2 mg twice a day as

needed, aspirin, the patient is on Celexa 40 mg daily, Neurontin 200 mg

twice a day, Zestril, Lopressor, methadone, and Seroquel will be started

for psychotic symptoms.



SOCIAL HISTORY:  The patient smokes about 2 packs a day.



LABORATORY DATA:  Reviewed.  White blood cells are elevated.  Chemistry

reviewed.  Sodium 128.  Urinalysis showed blood as well as bilirubin. 

Toxicology was positive for benzodiazepine as well as cannabis. 

Microbiology was reviewed.  Previous picture reviewed as well.  The patient

was seen by Dr. Bond in 06/2018.  The patient was on Celexa and Restoril

30 mg.  The patient also on Percocet and Restoril.  The patient then was

confused and had stimuli presentation.



IMPRESSION AND PLAN:  Most likely, the patient is in delirium stage,

leukocytosis there, withdrawals cannot exclude the patient when misusing or

abusing benzodiazepines with lot of pain medication.  Also Celexa was

started.  We will initiate Seroquel.  Medical team needs to work off

delirium.  We will try to suggest neurological evaluation.  We will follow

up and advise accordingly.



Thank you very much for letting me to participate in the care of your

patient.  Should you have any questions, give me a call back.







__________________________________________

Tosha Goldberg MD





DD:  01/16/2019 16:06:54

DT:  01/16/2019 19:12:12

Job # [id number]

[id number]

## 2019-01-16 NOTE — CP.PCM.PN
<Nithya Mo - Last Filed: 01/16/19 15:54>





Subjective





- Date & Time of Evaluation


Date of Evaluation: 01/16/19


Time of Evaluation: 14:48





- Subjective


Subjective: 





Nithya Mo, PGY-1 Medicine Progress Note for Dr. Pelaez:


Pt was seen and examined this AM at bedside. Pt continues to be altered, 

agitated and continues to have delusions of beheadings. Pts exam is limited due 

to tangential and delusional thought. Pt is AOx0. She continues to deny SI/HI. 





Objective





- Vital Signs/Intake and Output


Vital Signs (last 24 hours): 


                                        











Temp Pulse Resp BP Pulse Ox


 


 98.0 F   98 H  20   148/119 H  100 


 


 01/15/19 18:43  01/16/19 09:45  01/15/19 22:28  01/16/19 09:45  01/15/19 18:43








Intake and Output: 


                                        











 01/16/19 01/16/19





 06:59 18:59


 


Intake Total 430 


 


Balance 430 














- Medications


Medications: 


                               Current Medications





Alprazolam (Xanax)  2 mg PO BID PRN


   PRN Reason: Anxiety


   Last Admin: 01/16/19 09:28 Dose:  2 mg


Aspirin (Ecotrin)  81 mg PO DAILY [facility]


   Last Admin: 01/16/19 09:28 Dose:  81 mg


Citalopram Hydrobromide (Celexa)  40 mg PO DAILY [facility]


   Last Admin: 01/16/19 09:28 Dose:  40 mg


Gabapentin (Neurontin)  200 mg PO BID [facility]; Protocol


   Last Admin: 01/16/19 09:27 Dose:  200 mg


Lisinopril (Zestril)  20 mg PO DAILY [facility]


   Last Admin: 01/16/19 09:45 Dose:  20 mg


Metoprolol Tartrate (Lopressor)  25 mg PO BID [facility]


   Last Admin: 01/16/19 09:34 Dose:  25 mg


Nicotine (Nicoderm Cq)  1 patch TD DAILY [facility]


   Last Admin: 01/16/19 09:46 Dose:  1 patch


Non-Formulary Medication (Temazepam [Restoril])  30 mg PO [facility]











- Labs


Labs: 


                                        





                                 01/16/19 07:00 





                                 01/16/19 14:17 











- Constitutional


Appears: Non-toxic, No Acute Distress, Agitated, Confused





- Head Exam


Head Exam: ATRAUMATIC, NORMAL INSPECTION, NORMOCEPHALIC





- Eye Exam


Eye Exam: EOMI, Normal appearance, PERRL





- Respiratory Exam


Respiratory Exam: Clear to Ausculation Bilateral, NORMAL BREATHING PATTERN.  

absent: Accessory Muscle Use, Rales, Rhonchi, Wheezes, Respiratory Distress, St

ridor





- Cardiovascular Exam


Cardiovascular Exam: RRR, +S1, +S2.  absent: Gallop, Rubs





- GI/Abdominal Exam


GI & Abdominal Exam: Soft, Normal Bowel Sounds.  absent: Firm, Guarding, Rigid, 

Tenderness





- Extremities Exam


Extremities Exam: Normal Inspection.  absent: Calf Tenderness, Pedal Edema





- Back Exam


Back Exam: NORMAL INSPECTION.  absent: CVA tenderness (L), CVA tenderness (R)





- Neurological Exam


Neurological Exam: Alert, Altered, Awake


Additional comments: 





Pt refused to comply with neuro exam, no facial droop or slurred speech noted, 

pt was able to move all extremities freely without limitation of pain or 

weakness








- Psychiatric Exam


Additional comments: 





Denies SI/HI





- Skin


Skin Exam: Dry, Normal Color, Warm





Assessment and Plan





- Assessment and Plan (Free Text)


Assessment: 





Pt is a 68 yo F with pmhx of rheumatoid arthritis, chronic back pain, anxiety, 

HTN and depression who presents to the ED for AMS. Head CT was negative for 

intracranial path, CXR and UA was also negative. Pt continues to have delusions.


Plan: 





1) AMS


- 2/2 Delerium vs Psychosis, less likely infectious due to pt being afebrile, no

WBC count and UA and CXR are negative for infectious process


- Pt sees Dr. Chato Ugalde as her psychiatrist 


- Per pts pharmacy she just had her celexa picked up on 1/10/19


- Psychiatry consult, will await further recs


- Ammonia level is low


- UDS - Positive for benzos, and cannabis





2) Hyponatremia:


- Pts Na is 127, repeat BMP is 128


- f/u urine Na, Urine osms


- Will continue to monitor





3) Hx of Chronic Back Pain:


- Hold flexeril and Percocet since pt is not complaining of pain at this time





4) Hx of HTN


- Cont pts home lopression now and lisinopril in AM 





5) Hx of Anxiety:


- Switched pts xanax from 2mg scheduled to 2 mg PRN 





PPX:


DVT: SCDs


Regular Diet





Case Seen and discussed with Dr. Latanya Mo, PGY-1





<Harriet Pelaez - Last Filed: 01/16/19 18:07>





Objective





- Vital Signs/Intake and Output


Vital Signs (last 24 hours): 


                                        











Temp Pulse Resp BP Pulse Ox


 


 98.0 F   77   20   135/83   100 


 


 01/15/19 18:43  01/16/19 17:52  01/15/19 22:28  01/16/19 17:52  01/15/19 18:43








Intake and Output: 


                                        











 01/16/19 01/16/19





 06:59 18:59


 


Intake Total 430 


 


Balance 430 














- Medications


Medications: 


                               Current Medications





Alprazolam (Xanax)  2 mg PO BID PRN


   PRN Reason: Anxiety


   Last Admin: 01/16/19 09:28 Dose:  2 mg


Aspirin (Ecotrin)  81 mg PO DAILY [facility]


   Last Admin: 01/16/19 09:28 Dose:  81 mg


Citalopram Hydrobromide (Celexa)  40 mg PO DAILY [facility]


   Last Admin: 01/16/19 09:28 Dose:  40 mg


Gabapentin (Neurontin)  200 mg PO BID [facility]; Protocol


   Last Admin: 01/16/19 17:52 Dose:  200 mg


Lisinopril (Zestril)  20 mg PO DAILY [facility]


   Last Admin: 01/16/19 09:45 Dose:  20 mg


Metoprolol Tartrate (Lopressor)  25 mg PO BID [facility]


   Last Admin: 01/16/19 17:52 Dose:  25 mg


Nicotine (Nicoderm Cq)  1 patch TD DAILY [facility]


   Last Admin: 01/16/19 09:46 Dose:  1 patch


Quetiapine Fumarate (Seroquel)  25 mg PO HS [facility]; Protocol











- Labs


Labs: 


                                        





                                 01/16/19 07:00 





                                 01/16/19 14:17 











Attending/Attestation





- Attestation


I have personally seen and examined this patient.: Yes


I have fully participated in the care of the patient.: Yes


I have reviewed all pertinent clinical information, including history, physical 

exam and plan: Yes


Notes (Text): 





01/16/19 18:04


69 year old female with past medical history of rheumatoid arthritis, anxiety, 

depression and hypertension who presents with AMS/delirium.  CT head was 

negative.  UA not suggestive of UTI.  Urine drug screen positive for 

benzodiazepines and cannabinoids.  


Today patient is still agitated and confused.  Psychiatry evaluation was 

requested; will follow up on recommendations.


Hyponatremia this morning noted.  Urine studies, osmos and repeat BMP ordered 

for this afternoon.


Continue with home medications for hypertension.





Harriet Pelaez MD


Hospitalist.

## 2019-01-17 LAB
ALBUMIN SERPL-MCNC: 4.3 G/DL (ref 3–4.8)
ALBUMIN/GLOB SERPL: 1.5 {RATIO} (ref 1.1–1.8)
ALT SERPL-CCNC: 28 U/L (ref 7–56)
AST SERPL-CCNC: 28 U/L (ref 14–36)
BASOPHILS # BLD AUTO: 0.03 K/MM3 (ref 0–2)
BASOPHILS NFR BLD: 0.3 % (ref 0–3)
BUN SERPL-MCNC: 13 MG/DL (ref 7–21)
CALCIUM SERPL-MCNC: 9.8 MG/DL (ref 8.4–10.5)
EOSINOPHIL # BLD: 0.2 10*3/UL (ref 0–0.7)
EOSINOPHIL NFR BLD: 1.8 % (ref 1.5–5)
ERYTHROCYTE [DISTWIDTH] IN BLOOD BY AUTOMATED COUNT: 13 % (ref 11.5–14.5)
GFR NON-AFRICAN AMERICAN: > 60
GRANULOCYTES # BLD: 4.74 10*3/UL (ref 1.4–6.5)
GRANULOCYTES NFR BLD: 46.5 % (ref 50–68)
HGB BLD-MCNC: 12.5 G/DL (ref 12–16)
LYMPHOCYTES # BLD: 4.3 10*3/UL (ref 1.2–3.4)
LYMPHOCYTES NFR BLD AUTO: 42.3 % (ref 22–35)
MCH RBC QN AUTO: 31.1 PG (ref 25–35)
MCHC RBC AUTO-ENTMCNC: 34.6 G/DL (ref 31–37)
MCV RBC AUTO: 89.8 FL (ref 80–105)
MONOCYTES # BLD AUTO: 0.9 10*3/UL (ref 0.1–0.6)
MONOCYTES NFR BLD: 9.1 % (ref 1–6)
PLATELET # BLD: 476 10^3/UL (ref 120–450)
PMV BLD AUTO: 8.3 FL (ref 7–11)
RBC # BLD AUTO: 4.02 10^6/UL (ref 3.5–6.1)
WBC # BLD AUTO: 10.2 10^3/UL (ref 4.5–11)

## 2019-01-17 NOTE — PN
DATE:  01/17/2019



SUBJECTIVE:  The patient was admitted in confused stage.  A psych consult

was called because the patient has a history of mental illness as well as

history of confusion and the patient presented to be psychotic.  Please see

initial note for more detailed information.  The patient was seen today. 

There is some mild improvement with the patient presentation.  The patient

was less psychotic, but still paranoid.  As per nursing report, the patient

had impression that she had chopped head in her jacket, obviously there is

nothing like that happened.  The patient presented to be overly excited

when this writer talked to her.  The patient was willing to sign herself

into the psychiatric inpatient unit for further stabilization and

observation, but when  tried to obtain informed consent, the

patient declined that offer.  This writer talked to the patient again.  The

patient presented to be irritable, obviously change in mental status, most

likely the patient is in delirium stage still.



LABORATORY DATA:  Reviewed.



MEDICATIONS:  Reviewed.  Discussed with attending Dr. Pelaez and resident. 

At present moment, the patient is not committable to [facility].  This writer will increase the dose of Seroquel at the nighttime to

50 mg and we will see from that.  Meanwhile, the patient filled medication

in [facility].  The patient's medical team was advised 
to call and confirm

medications.  The patient reported that she was on temazepam, Xanax as well

as Celexa 40 mg.  The patient reported that she has her outpatient

psychiatrist, Dr. Chato Pacheco, she is planning to see him, but the patient

does not know when is her next appointment.



MENTAL STATUS EXAMINATION:  The patient presented to be confused and has

frequent change in mental status.  Mood described as not good "I'm angry." 

Affect was constricted and irritable.  Thought process circumstantial and

tangential.  Thought content, the patient denied visual, auditory, or

tactile hallucinations but obviously the patient is in delirium stage and

psychotic.  Insight and judgment seems to be impaired.  Impulses are

unpredictable.



IMPRESSION:  As per history, the patient has history of questionable

anxiety.  At present moment, the patient is in delirium stage, which is

related to polypharmacy and multiple medical issues and possible misuse and

overuse of psychotropic medication.



PLAN:  This writer suggested psych admission.  The patient initially agreed

with that plan, but declined that offer later on.  The patient still in

delirium stage.  This writer will suggest to increase the dose of Seroquel

to 50 mg at the nighttime.  We will continue monitoring and advise

accordingly.



Thank you very much for letting me to participate in the care of your

patient.  Care of this patient took more than 45 minutes of this writer's

time.







__________________________________________

Tosha Goldberg MD





DD:  01/17/2019 16:25:44

DT:  01/17/2019 16:29:29

Job # [id number]

MTDD

## 2019-01-17 NOTE — CP.PCM.PCO
Physician Communication Note





- Physician Communication Note


Physician Communication Note: Per Psych, pt willing to sign herself to 5B, 

awaiting medical clearance

## 2019-01-17 NOTE — CP.PCM.PN
<Nithya Mo - Last Filed: 01/17/19 22:27>





Subjective





- Date & Time of Evaluation


Date of Evaluation: 01/17/19


Time of Evaluation: 22:27





- Subjective


Subjective: 





Nithya Mo, PGY-1 Medicine Progress Note for Dr. Pelaez:


Pt was seen and examined this AM at bedside. Pt continues to be altered, 

agitated though she did not express any delusions through the exam we had with 

the pt. The pt did not wish to cooperate with answering ROS questions and became

increasingly agitated at different attempts of obtaining a history. 








Objective





- Vital Signs/Intake and Output


Vital Signs (last 24 hours): 


                                        











Temp Pulse Resp BP Pulse Ox


 


 97.4 F L  63   18   132/69   96 


 


 01/17/19 14:00  01/17/19 14:00  01/17/19 14:00  01/17/19 14:00  01/17/19 14:00








Intake and Output: 


                                        











 01/17/19 01/17/19





 06:59 18:59


 


Intake Total 180 


 


Balance 180 














- Medications


Medications: 


                               Current Medications





Alprazolam (Xanax)  2 mg PO BID PRN


   PRN Reason: Anxiety


   Last Admin: 01/17/19 11:27 Dose:  2 mg


Aspirin (Ecotrin)  81 mg PO DAILY [facility]


   Last Admin: 01/17/19 10:29 Dose:  81 mg


Citalopram Hydrobromide (Celexa)  40 mg PO DAILY [facility]


   Last Admin: 01/17/19 10:29 Dose:  40 mg


Cyclobenzaprine HCl (Flexeril)  5 mg PO DAILY [facility]


Gabapentin (Neurontin)  300 mg PO TID [facility]; Protocol


   Last Admin: 01/17/19 14:31 Dose:  300 mg


Lisinopril (Zestril)  20 mg PO DAILY [facility]


   Last Admin: 01/17/19 10:30 Dose:  20 mg


Metoprolol Tartrate (Lopressor)  25 mg PO BID [facility]


   Last Admin: 01/17/19 10:29 Dose:  25 mg


Nicotine (Nicoderm Cq)  1 patch TD DAILY [facility]


   Last Admin: 01/17/19 10:29 Dose:  1 patch


Oxycodone/Acetaminophen (Percocet 5/325 Mg Tab)  1 tab PO BID PRN


   PRN Reason: Pain, severe (8-10)


   Stop: 01/20/19 12:45


Quetiapine Fumarate (Seroquel)  50 mg PO HS [facility]; Protocol











- Labs


Labs: 


                                        





                                 01/17/19 07:00 





                                 01/17/19 07:00 











- Constitutional


Appears: Non-toxic, No Acute Distress, Agitated, Confused





- Head Exam


Head Exam: ATRAUMATIC, NORMAL INSPECTION, NORMOCEPHALIC





- Eye Exam


Eye Exam: EOMI, Normal appearance, PERRL





- Respiratory Exam


Respiratory Exam: Clear to Ausculation Bilateral, Respiratory Distress, NORMAL 

BREATHING PATTERN.  absent: Accessory Muscle Use, Rales, Rhonchi, Wheezes





- Cardiovascular Exam


Cardiovascular Exam: RRR, +S1, +S2.  absent: Gallop, Rubs





- GI/Abdominal Exam


GI & Abdominal Exam: Soft, Normal Bowel Sounds.  absent: Firm, Guarding, Rigid, 

Tenderness





- Extremities Exam


Extremities Exam: Normal Inspection.  absent: Calf Tenderness, Pedal Edema





- Back Exam


Back Exam: NORMAL INSPECTION.  absent: CVA tenderness (L), CVA tenderness (R)





- Neurological Exam


Neurological Exam: Alert, Awake





- Psychiatric Exam


Psychiatric exam: Agitated





- Skin


Skin Exam: Dry, Normal Color, Warm





Assessment and Plan





- Assessment and Plan (Free Text)


Assessment: 





Pt is a 70 yo F with pmhx of rheumatoid arthritis, chronic back pain, anxiety, 

HTN and depression who presents to the ED for AMS. Head CT was negative for 

intracranial path, CXR and UA was also negative. Pt was given seroquel per 

psych. Pt was accepted to psych team, for voluntary admission, but then had 

changed mind later in the day. Per psych they will attempt to talk to pt again 

about psych admission.


Plan: 


1) AMS


- 2/2 Delerium vs Psychosis


- Pt sees Dr. Chato Ugalde as her psychiatrist 


- Per pts pharmacy she just had her celexa picked up on 1/10/19


- Psychiatry consult - added seroquel, will reattempt for inpt psych admission


- Ammonia level is low


- UDS - Positive for benzos, and cannabis





2) Hyponatremia:


- Pts Na is 127, repeat BMP is 128


- f/u urine Na, Urine osms


- Will continue to monitor





3) Hx of Chronic Back Pain:


- Pts home flexeril and percocet were restarted on lower dose





4) Hx of HTN


- Cont pts home lopression and lisinopril





5) Hx of Anxiety:


- Switched pts xanax from 2mg BID





PPX:


DVT: SCDs


Regular Diet





Case Seen and discussed with Dr. Latanya Mo, PGY-1





<Harriet Pelaez - Last Filed: 01/18/19 08:55>





Objective





- Vital Signs/Intake and Output


Vital Signs (last 24 hours): 


                                        











Temp Pulse Resp BP Pulse Ox


 


 97.8 F   54 L  19   111/64   95 


 


 01/18/19 06:00  01/18/19 06:00  01/18/19 06:00  01/18/19 06:00  01/18/19 06:00








Intake and Output: 


                                        











 01/18/19 01/18/19





 06:59 18:59


 


Intake Total 240 


 


Balance 240 














- Medications


Medications: 


                               Current Medications





Alprazolam (Xanax)  2 mg PO BID PRN


   PRN Reason: Anxiety


   Last Admin: 01/18/19 06:48 Dose:  2 mg


Aspirin (Ecotrin)  81 mg PO DAILY [facility]


   Last Admin: 01/17/19 10:29 Dose:  81 mg


Citalopram Hydrobromide (Celexa)  40 mg PO DAILY [facility]


   Last Admin: 01/17/19 10:29 Dose:  40 mg


Cyclobenzaprine HCl (Flexeril)  5 mg PO DAILY [facility]


Gabapentin (Neurontin)  300 mg PO TID [facility]; Protocol


   Last Admin: 01/17/19 18:06 Dose:  300 mg


Lisinopril (Zestril)  20 mg PO DAILY [facility]


   Last Admin: 01/17/19 10:30 Dose:  20 mg


Metoprolol Tartrate (Lopressor)  25 mg PO BID [facility]


   Last Admin: 01/17/19 18:07 Dose:  25 mg


Nicotine (Nicoderm Cq)  1 patch TD DAILY [facility]


   Last Admin: 01/17/19 10:29 Dose:  1 patch


Oxycodone/Acetaminophen (Percocet 5/325 Mg Tab)  1 tab PO BID PRN


   PRN Reason: Pain, severe (8-10)


   Stop: 01/20/19 12:45


Quetiapine Fumarate (Seroquel)  50 mg PO HS [facility]; Protocol


   Last Admin: 01/17/19 22:35 Dose:  50 mg











- Labs


Labs: 


                                        





                                 01/18/19 07:00 





                                 01/18/19 07:00 











Attending/Attestation





- Attestation


I have personally seen and examined this patient.: Yes


I have fully participated in the care of the patient.: Yes


I have reviewed all pertinent clinical information, including history, physical 

exam and plan: Yes


Notes (Text): 





01/17/19


69 year old female with past medical history of rheumatoid arthritis, anxiety, 

depression and hypertension who presents with AMS/delirium.  


CT head was negative.  UA not suggestive of UTI.  Urine drug screen positive for

benzodiazepines and cannabinoids.  


Delirium is improving although patient remains agitated at times.  Psychiatry is

following and increased seroquel.


Plan for possible inpatient psychiatric admission.


Hyponatremia and leukocytosis have improved.


Continue with home medications for hypertension.





Harriet Pelaez MD


Hospitalist.

## 2019-01-18 VITALS
TEMPERATURE: 97.8 F | DIASTOLIC BLOOD PRESSURE: 64 MMHG | RESPIRATION RATE: 19 BRPM | SYSTOLIC BLOOD PRESSURE: 111 MMHG | HEART RATE: 54 BPM | OXYGEN SATURATION: 95 %

## 2019-01-18 LAB
ALBUMIN SERPL-MCNC: 4.1 G/DL (ref 3–4.8)
ALBUMIN/GLOB SERPL: 1.5 {RATIO} (ref 1.1–1.8)
ALT SERPL-CCNC: 27 U/L (ref 7–56)
AST SERPL-CCNC: 23 U/L (ref 14–36)
BASOPHILS # BLD AUTO: 0.04 K/MM3 (ref 0–2)
BASOPHILS NFR BLD: 0.4 % (ref 0–3)
BUN SERPL-MCNC: 20 MG/DL (ref 7–21)
CALCIUM SERPL-MCNC: 9.9 MG/DL (ref 8.4–10.5)
EOSINOPHIL # BLD: 0.3 10*3/UL (ref 0–0.7)
EOSINOPHIL NFR BLD: 2.9 % (ref 1.5–5)
ERYTHROCYTE [DISTWIDTH] IN BLOOD BY AUTOMATED COUNT: 13.1 % (ref 11.5–14.5)
GFR NON-AFRICAN AMERICAN: > 60
GRANULOCYTES # BLD: 5.04 10*3/UL (ref 1.4–6.5)
GRANULOCYTES NFR BLD: 47.7 % (ref 50–68)
HGB BLD-MCNC: 12.1 G/DL (ref 12–16)
LYMPHOCYTES # BLD: 4.3 10*3/UL (ref 1.2–3.4)
LYMPHOCYTES NFR BLD AUTO: 40.3 % (ref 22–35)
MCH RBC QN AUTO: 30.8 PG (ref 25–35)
MCHC RBC AUTO-ENTMCNC: 33.6 G/DL (ref 31–37)
MCV RBC AUTO: 91.6 FL (ref 80–105)
MONOCYTES # BLD AUTO: 0.9 10*3/UL (ref 0.1–0.6)
MONOCYTES NFR BLD: 8.7 % (ref 1–6)
PLATELET # BLD: 441 10^3/UL (ref 120–450)
PMV BLD AUTO: 8.5 FL (ref 7–11)
RBC # BLD AUTO: 3.93 10^6/UL (ref 3.5–6.1)
WBC # BLD AUTO: 10.6 10^3/UL (ref 4.5–11)

## 2019-01-18 NOTE — CP.PCM.DIS
<Nithya Mo - Last Filed: 01/19/19 12:57>





Provider





- Provider


Date of Admission: 


01/15/19 17:17





Attending physician: 


Harriet Pelaez MD





Primary care physician: 


Marci Zapata MD





Consults: 








01/15/19 18:19


Psychiatry Consult Routine 


   Comment: 


   Consulting Provider: Tosha Goldberg


   Consulting Physician: Tosha Goldberg


   Reason for Consult: tangential speech; anxious; not taking her meds











Time Spent in preparation of Discharge (in minutes): 45





Diagnosis





- Discharge Diagnosis


(1) Psychosis


Status: Acute   





(2) Delirium


Status: Acute   





Hospital Course





- Lab Results


Lab Results: 


                                  Micro Results





01/15/19 17:00   Urine,Clean Catch   Urine Culture - Final


                            No Growth (<1,000 CFU/ML)





                             Most Recent Lab Values











WBC  10.6 10^3/uL (4.5-11.0)   01/18/19  07:00    


 


RBC  3.93 10^6/uL (3.5-6.1)   01/18/19  07:00    


 


Hgb  12.1 g/dL (12.0-16.0)   01/18/19  07:00    


 


Hct  36.0 % (36.0-48.0)   01/18/19  07:00    


 


MCV  91.6 fl (80.0-105.0)   01/18/19  07:00    


 


MCH  30.8 pg (25.0-35.0)   01/18/19  07:00    


 


MCHC  33.6 g/dl (31.0-37.0)   01/18/19  07:00    


 


RDW  13.1 % (11.5-14.5)   01/18/19  07:00    


 


Plt Count  441 10^3/uL (120.0-450.0)   01/18/19  07:00    


 


MPV  8.5 fl (7.0-11.0)   01/18/19  07:00    


 


Gran %  47.7 % (50.0-68.0)  L  01/18/19  07:00    


 


Lymph % (Auto)  40.3 % (22.0-35.0)  H  01/18/19  07:00    


 


Mono % (Auto)  8.7 % (1.0-6.0)  H  01/18/19  07:00    


 


Eos % (Auto)  2.9 % (1.5-5.0)   01/18/19  07:00    


 


Baso % (Auto)  0.4 % (0.0-3.0)   01/18/19  07:00    


 


Gran #  5.04  (1.4-6.5)   01/18/19  07:00    


 


Lymph # (Auto)  4.3  (1.2-3.4)  H  01/18/19  07:00    


 


Mono # (Auto)  0.9  (0.1-0.6)  H  01/18/19  07:00    


 


Eos # (Auto)  0.3  (0.0-0.7)   01/18/19  07:00    


 


Baso # (Auto)  0.04 K/mm3 (0.0-2.0)   01/18/19  07:00    


 


Sodium  134 mmol/L (132-148)   01/18/19  07:00    


 


Potassium  5.0 mmol/L (3.6-5.0)   01/18/19  07:00    


 


Chloride  98 mmol/L ()   01/18/19  07:00    


 


Carbon Dioxide  32 mmol/L (21-33)   01/18/19  07:00    


 


Anion Gap  9  (10-20)  L  01/18/19  07:00    


 


BUN  20 mg/dL (7-21)   01/18/19  07:00    


 


Creatinine  0.9 mg/dl (0.7-1.2)   01/18/19  07:00    


 


Est GFR ( Amer)  > 60   01/18/19  07:00    


 


Est GFR (Non-Af Amer)  > 60   01/18/19  07:00    


 


Random Glucose  77 mg/dL ()   01/18/19  07:00    


 


Serum Osmolality  257 mosm/kg (272-300)  L  01/16/19  12:50    


 


Calcium  9.9 mg/dL (8.4-10.5)   01/18/19  07:00    


 


Magnesium  1.9 mg/dL (1.7-2.2)   01/15/19  13:40    


 


Total Bilirubin  0.2 mg/dL (0.2-1.3)   01/18/19  07:00    


 


AST  23 U/L (14-36)   01/18/19  07:00    


 


ALT  27 U/L (7-56)   01/18/19  07:00    


 


Alkaline Phosphatase  47 U/L ()   01/18/19  07:00    


 


Ammonia  < 9 umol/L (9-33)  L  01/15/19  17:49    


 


Total Protein  6.8 g/dL (5.8-8.3)   01/18/19  07:00    


 


Albumin  4.1 g/dL (3.0-4.8)   01/18/19  07:00    


 


Globulin  2.7 gm/dL  01/18/19  07:00    


 


Albumin/Globulin Ratio  1.5  (1.1-1.8)   01/18/19  07:00    


 


TSH 3rd Generation  0.58 mIU/mL (0.46-4.68)   01/16/19  07:00    


 


Urine Color  Yellow  (YELLOW)   01/15/19  16:59    


 


Urine Appearance  Clear  (CLEAR)   01/15/19  16:59    


 


Urine pH  6.0  (4.7-8.0)   01/15/19  16:59    


 


Ur Specific Gravity  >= 1.030  (1.005-1.035)   01/15/19  16:59    


 


Urine Protein  Negative mg/dL (<30 mg/dL)   01/15/19  16:59    


 


Urine Glucose (UA)  Negative mg/dL (NEGATIVE)   01/15/19  16:59    


 


Urine Ketones  15 mg/dL (NEGATIVE)  H  01/15/19  16:59    


 


Urine Blood  Trace-intact  (NEGATIVE)  H  01/15/19  16:59    


 


Urine Nitrate  Negative  (NEGATIVE)   01/15/19  16:59    


 


Urine Bilirubin  Small  (NEGATIVE)  H  01/15/19  16:59    


 


Urine Urobilinogen  0.2 E.U./dL (<1 E.U./dL)   01/15/19  16:59    


 


Ur Leukocyte Esterase  Negative Karissa/uL (NEGATIVE)   01/15/19  16:59    


 


Urine RBC  1 - 3 /hpf (0-2)  H  01/15/19  16:59    


 


Urine WBC  0 - 2 /hpf (0-6)   01/15/19  16:59    


 


Ur Epithelial Cells  3 - 4 /hpf (0-5)   01/15/19  16:59    


 


Urine Bacteria  Few /hpf (NONE)   01/15/19  16:59    


 


Salicylates  < 1 mg/dL (2.0-20.0)  L  01/15/19  17:57    


 


Urine Opiates Screen  Negative  (NEGATIVE)   01/15/19  16:59    


 


Urine Methadone Screen  Negative  (NEGATIVE)   01/15/19  16:59    


 


Acetaminophen  < 10.0 ug/ml (10.0-20.0)  L  01/15/19  17:57    


 


Ur Barbiturates Screen  Negative  (NEGATIVE)   01/15/19  16:59    


 


Ur Phencyclidine Scrn  Negative  (NEGATIVE)   01/15/19  16:59    


 


Ur Amphetamines Screen  Negative  (NEGATIVE)   01/15/19  16:59    


 


U Benzodiazepines Scrn  Positive  (NEGATIVE)  H  01/15/19  16:59    


 


U Oth Cocaine Metabols  Negative  (NEGATIVE)   01/15/19  16:59    


 


U Cannabinoids Screen  Positive  (NEGATIVE)  H  01/15/19  16:59    


 


Alcohol, Quantitative  < 10 mg/dL (0-10)   01/15/19  13:40    














- Hospital Course


Hospital Course: 





Upon Admission:


Pt is a 70 yo F with pmhx of rheumatoid arthritis, chronic back pain, anxiety, 

HTN and depression who presents to the ED for AMS. Pt is actively confused, 

tangential and is having delusions of people having body parts and heads that 

are not matching. Pt also claims that her meds have run out but when confirmed 

by pharmacy, and by looking at recent visits she was here for a similar problem 

and had her medication filled and picked up within the past week. Pts PMD, Marci Robin was called and stated that this was something new for the pt. 

Further HPI and ROS could not be obtained due to pts current confused and 

agitated state. Other Hx was obtained through chart review. At this time Pt 

denies SI/HI.





Hospital Course:


Pt was being worked up for AMS 2/2 delerium vs psychosis vs infectious cause. Pt

had CXR done in ED which was negative for an infectious process, as was pts UA. 

Pt has remained afebrile during her stay and WBC stayed wnl for a majority of 

her stay as well. After medical causes of delerium vs psychosis were ruled out 

psychiatry was consulted to further evaluate the pt. Pts drug screen was noted 

to be positive for benzos (which she is noted to be taking as a home medication)

and cannabis. Per psychiatry pt is noted to be in an episode of psychosis with d

elerium. Psychiatry added on seroquel for the pt and was re-evaluated the next 

day. Pt still showed some signs of psychosis and delerium with some agitation. 

Pts seroquel was increased per psych. Pt was then re-evaluated again and was 

noted to be no longer having any active delusions. Pt apologized for her 

previous actions. Pt was offered voluntary inpt psychiatric admission which the 

pt refused. Per psych the pt was cleared for discharge with the addition of 

seroquel. Pt was informed of the plan for discharge and the pt expressed 

understanding and agreement of the plan for discharge. All of the pts questions 

and concerns were addressed prior to d/c. Pt was informed about the importance 

of follow up with her already established psychiatrist Dr. Ugalde.





Upon Discharge:


Pt was given the following directions upon discharge from the hospital:


Please follow up with primary care doctor, Benny Chu upon discharge 

within 3-5 days from psych.  


Please take your newly prescribed medication, Seroquel 50mg at bedtime as 

directed.


Please also follow up with your psychiatrist, Dr. Ugalde within 3-5 days upon 

discharge.


Please refrain from drug use such as marijuana. 


If you experience new or worsening symptoms then please return to the emergency 

department.





Discharge Exam





- Head Exam


Head Exam: ATRAUMATIC, NORMAL INSPECTION, NORMOCEPHALIC





- Eye Exam


Eye Exam: EOMI, Normal appearance, PERRL





- Respiratory Exam


Respiratory Exam: Clear to PA & Lateral, NORMAL BREATHING PATTERN, UNREMARKABLE.

 absent: Accessory Muscle Use, Rales, Rhonchi, Wheezes, Respiratory Distress, 

Stridor





- Cardiovascular Exam


Cardiovascular Exam: RRR, +S1, +S2.  absent: Gallop, Rubs, Systolic Murmur





- GI/Abdominal Exam


GI & Abdominal Exam: Normal Bowel Sounds, Soft, Unremarkable.  absent: 

Distended, Firm, Guarding, Tenderness





- Extremities Exam


Extremities exam: normal capillary refill, normal inspection, pedal pulses 

present





- Back Exam


Back exam: NORMAL INSPECTION.  absent: CVA tenderness (L), CVA tenderness (R)





- Neurological Exam


Neurological exam: Alert, Oriented x3





- Psychiatric Exam


Psychiatric exam: Normal Affect, Normal Mood





- Skin


Skin Exam: Dry, Intact, Normal Color, Warm





Discharge Plan





- Discharge Medications


Prescriptions: 


Citalopram Hydrobromide [Celexa] 40 mg PO DAILY 7 Days #7 tablet


Nicotine 21 mg/24 hr [Nicoderm Cq] 1 patch TD DAILY 14 Days #14 patch


QUEtiapine [Seroquel] 50 mg PO HS 7 Days #7 tab





- Follow Up Plan


Condition: STABLE


Disposition: HOME/ ROUTINE


Instructions:  Pneumococcal Conjugate Vaccine (10-Valent), Generalized Anxiety 

Disorder (DC), Flu Vaccine


Additional Instructions: 


Please follow up with primary care doctor, Benny Chu upon discharge 

within 3-5 days from psych.  


Please take your newly prescribed medication, Seroquel 50mg at bedtime as 

directed.


Please also follow up with your psychiatrist, Dr. Ugalde within 3-5 days upon 

discharge.


Please refrain from drug use such as marijuana. 


If you experience new or worsening symptoms then please return to the emergency 

department.





Referrals: 


Marci Zapata MD [Primary Care Provider] - 





<Harriet Pelaez - Last Filed: 01/19/19 13:49>





Provider





- Provider


Date of Admission: 


01/15/19 17:17





Attending physician: 


Harriet Pelaez MD





Primary care physician: 


Marci Zapata MD





Consults: 








01/15/19 18:19


Psychiatry Consult Routine 


   Comment: 


   Consulting Provider: Tosha Goldberg


   Consulting Physician: Tosha Goldberg


   Reason for Consult: tangential speech; anxious; not taking her meds














Hospital Course





- Lab Results


Lab Results: 


                                  Micro Results





01/15/19 17:00   Urine,Clean Catch   Urine Culture - Final


                            No Growth (<1,000 CFU/ML)





                             Most Recent Lab Values











WBC  10.6 10^3/uL (4.5-11.0)   01/18/19  07:00    


 


RBC  3.93 10^6/uL (3.5-6.1)   01/18/19  07:00    


 


Hgb  12.1 g/dL (12.0-16.0)   01/18/19  07:00    


 


Hct  36.0 % (36.0-48.0)   01/18/19  07:00    


 


MCV  91.6 fl (80.0-105.0)   01/18/19  07:00    


 


MCH  30.8 pg (25.0-35.0)   01/18/19  07:00    


 


MCHC  33.6 g/dl (31.0-37.0)   01/18/19  07:00    


 


RDW  13.1 % (11.5-14.5)   01/18/19  07:00    


 


Plt Count  441 10^3/uL (120.0-450.0)   01/18/19  07:00    


 


MPV  8.5 fl (7.0-11.0)   01/18/19  07:00    


 


Gran %  47.7 % (50.0-68.0)  L  01/18/19  07:00    


 


Lymph % (Auto)  40.3 % (22.0-35.0)  H  01/18/19  07:00    


 


Mono % (Auto)  8.7 % (1.0-6.0)  H  01/18/19  07:00    


 


Eos % (Auto)  2.9 % (1.5-5.0)   01/18/19  07:00    


 


Baso % (Auto)  0.4 % (0.0-3.0)   01/18/19  07:00    


 


Gran #  5.04  (1.4-6.5)   01/18/19  07:00    


 


Lymph # (Auto)  4.3  (1.2-3.4)  H  01/18/19  07:00    


 


Mono # (Auto)  0.9  (0.1-0.6)  H  01/18/19  07:00    


 


Eos # (Auto)  0.3  (0.0-0.7)   01/18/19  07:00    


 


Baso # (Auto)  0.04 K/mm3 (0.0-2.0)   01/18/19  07:00    


 


Sodium  134 mmol/L (132-148)   01/18/19  07:00    


 


Potassium  5.0 mmol/L (3.6-5.0)   01/18/19  07:00    


 


Chloride  98 mmol/L ()   01/18/19  07:00    


 


Carbon Dioxide  32 mmol/L (21-33)   01/18/19  07:00    


 


Anion Gap  9  (10-20)  L  01/18/19  07:00    


 


BUN  20 mg/dL (7-21)   01/18/19  07:00    


 


Creatinine  0.9 mg/dl (0.7-1.2)   01/18/19  07:00    


 


Est GFR ( Amer)  > 60   01/18/19  07:00    


 


Est GFR (Non-Af Amer)  > 60   01/18/19  07:00    


 


Random Glucose  77 mg/dL ()   01/18/19  07:00    


 


Serum Osmolality  257 mosm/kg (272-300)  L  01/16/19  12:50    


 


Calcium  9.9 mg/dL (8.4-10.5)   01/18/19  07:00    


 


Magnesium  1.9 mg/dL (1.7-2.2)   01/15/19  13:40    


 


Total Bilirubin  0.2 mg/dL (0.2-1.3)   01/18/19  07:00    


 


AST  23 U/L (14-36)   01/18/19  07:00    


 


ALT  27 U/L (7-56)   01/18/19  07:00    


 


Alkaline Phosphatase  47 U/L ()   01/18/19  07:00    


 


Ammonia  < 9 umol/L (9-33)  L  01/15/19  17:49    


 


Total Protein  6.8 g/dL (5.8-8.3)   01/18/19  07:00    


 


Albumin  4.1 g/dL (3.0-4.8)   01/18/19  07:00    


 


Globulin  2.7 gm/dL  01/18/19  07:00    


 


Albumin/Globulin Ratio  1.5  (1.1-1.8)   01/18/19  07:00    


 


TSH 3rd Generation  0.58 mIU/mL (0.46-4.68)   01/16/19  07:00    


 


Urine Color  Yellow  (YELLOW)   01/15/19  16:59    


 


Urine Appearance  Clear  (CLEAR)   01/15/19  16:59    


 


Urine pH  6.0  (4.7-8.0)   01/15/19  16:59    


 


Ur Specific Gravity  >= 1.030  (1.005-1.035)   01/15/19  16:59    


 


Urine Protein  Negative mg/dL (<30 mg/dL)   01/15/19  16:59    


 


Urine Glucose (UA)  Negative mg/dL (NEGATIVE)   01/15/19  16:59    


 


Urine Ketones  15 mg/dL (NEGATIVE)  H  01/15/19  16:59    


 


Urine Blood  Trace-intact  (NEGATIVE)  H  01/15/19  16:59    


 


Urine Nitrate  Negative  (NEGATIVE)   01/15/19  16:59    


 


Urine Bilirubin  Small  (NEGATIVE)  H  01/15/19  16:59    


 


Urine Urobilinogen  0.2 E.U./dL (<1 E.U./dL)   01/15/19  16:59    


 


Ur Leukocyte Esterase  Negative Karissa/uL (NEGATIVE)   01/15/19  16:59    


 


Urine RBC  1 - 3 /hpf (0-2)  H  01/15/19  16:59    


 


Urine WBC  0 - 2 /hpf (0-6)   01/15/19  16:59    


 


Ur Epithelial Cells  3 - 4 /hpf (0-5)   01/15/19  16:59    


 


Urine Bacteria  Few /hpf (NONE)   01/15/19  16:59    


 


Salicylates  < 1 mg/dL (2.0-20.0)  L  01/15/19  17:57    


 


Urine Opiates Screen  Negative  (NEGATIVE)   01/15/19  16:59    


 


Urine Methadone Screen  Negative  (NEGATIVE)   01/15/19  16:59    


 


Acetaminophen  < 10.0 ug/ml (10.0-20.0)  L  01/15/19  17:57    


 


Ur Barbiturates Screen  Negative  (NEGATIVE)   01/15/19  16:59    


 


Ur Phencyclidine Scrn  Negative  (NEGATIVE)   01/15/19  16:59    


 


Ur Amphetamines Screen  Negative  (NEGATIVE)   01/15/19  16:59    


 


U Benzodiazepines Scrn  Positive  (NEGATIVE)  H  01/15/19  16:59    


 


U Oth Cocaine Metabols  Negative  (NEGATIVE)   01/15/19  16:59    


 


U Cannabinoids Screen  Positive  (NEGATIVE)  H  01/15/19  16:59    


 


Alcohol, Quantitative  < 10 mg/dL (0-10)   01/15/19  13:40    














Attending/Attestation





- Attestation


I have personally seen and examined this patient.: Yes


I have fully participated in the care of the patient.: Yes


I have reviewed all pertinent clinical information, including history, physical 

exam and plan: Yes


Notes (Text): 





01/19/19 13:42


69 year old female with past medical history of rheumatoid arthritis, anxiety, 

depression and hypertension who presented with AMS/delirium.  CT head was 

negative.  UA not suggestive of UTI.  Urine drug screen positive for 

benzodiazepines and cannabinoids.  She was seen by psychiatry who added 

seroquel.  Patient's agitation and delirium began to improve.  She was offered 

voluntary inpatient psychiatric admission which she declined.  She is cleared 

for discharge by psychiatrist with outpatient follow up.





Patient is discharged home to follow up with pmd.


Follow up with psychiatrist.


Daughter was called and updated on hospital course.





Harriet Pelaez MD


Hospitalist.

## 2019-01-18 NOTE — CON
DATE:  01/18/2019



HISTORY OF PRESENT ILLNESS:  The patient is a 69-year-old  female

with a history of anxiety and depression.  Psychiatry has been consulting

her on medical side due to psychosis, delusions, and restlessness on the

unit.  The patient was acutely delusional when Dr. Goldberg visited her

on 01/16/2019.  She was noted to be paranoid and disorganized, was

difficult to redirect and actively hallucinating at that time.  Dr. Goldberg met with her again the next day and she noticed some

improvement.  She was less psychotic, less disorganized, still paranoid. 

She thought that the patient was delusional.  The patient had a delusion

that the nursing staff is against her and had initially agreed to sign

herself into the Psychiatry unit, however, eventually refused to sign in. 

She was irritable and considered to be still delirious at that time of Dr. Goldberg's visit yesterday.  I met with the patient at bedside this

morning and she is a lot calmer based on prior reports.  She is able to

inform me what the current location is, month, and year, and she reports

that she is doing much better.  She indicates that she wants to get better

and get out of here, and she continues to defer on any psychiatric

admission.  The patient tells me that she sees Dr. Pacheco and plans to see

him when she is eventually discharged.  She denies any hallucinations.  She

denies anything that anybody is out to get her; however, she feels that she

was unfairly called a nutjob and she does not appear to have much memory of

her prior hallucinations or delusions.  She appears labile, mildly annoyed,

but she does not escalate during the course of our conversation.  She does

not appear to be overly engaged with having a conversation with Psychiatry,

but she does not appear to be any danger to others, she does not make any

threats, she does not appear to be overly paranoid.  She denies any

thoughts to harm herself or harm anybody else.  There have been no major

behavioral issues.  Her insight and judgment are improving.



Relevant psychiatric medications include Xanax 2 mg orally two times a day

as needed, Celexa 40 mg daily, Seroquel 50 mg as scheduled.



Labs and vitals were reviewed.



The patient is demonstrating improving delirium.  She also has a history of

depression and anxiety.  It does not appear that these specific symptoms

are contributory to her presentation at this time except for _____

anxiety/panic on the unit.



RECOMMENDATIONS:  Again, when this writer suggested Psychiatry admission,

the patient deferred again.  The patient does not meet criteria for

involuntary admission and she does not want to undergo psychiatric care,

indicating that she would follow up with Dr. Pacheco once she is medically

cleared.  At this time, Psychiatry will sign-off.  Please reconsult as

needed.







__________________________________________

Olman Almeida MD





DD:  01/18/2019 8:01:02

DT:  01/18/2019 13:02:09

Job # [id number]

## 2019-01-30 ENCOUNTER — HOSPITAL ENCOUNTER (INPATIENT)
Dept: HOSPITAL 42 - ED | Age: 69
LOS: 2 days | Discharge: HOME | DRG: 641 | End: 2019-02-01
Attending: INTERNAL MEDICINE | Admitting: INTERNAL MEDICINE
Payer: MEDICARE

## 2019-01-30 VITALS — BODY MASS INDEX: 20.5 KG/M2

## 2019-01-30 DIAGNOSIS — E87.1: Primary | ICD-10-CM

## 2019-01-30 DIAGNOSIS — I10: ICD-10-CM

## 2019-01-30 DIAGNOSIS — F17.200: ICD-10-CM

## 2019-01-30 DIAGNOSIS — R29.6: ICD-10-CM

## 2019-01-30 DIAGNOSIS — F41.0: ICD-10-CM

## 2019-01-30 DIAGNOSIS — G89.29: ICD-10-CM

## 2019-01-30 DIAGNOSIS — F32.89: ICD-10-CM

## 2019-01-30 DIAGNOSIS — E78.00: ICD-10-CM

## 2019-01-30 DIAGNOSIS — Z90.710: ICD-10-CM

## 2019-01-30 DIAGNOSIS — M06.9: ICD-10-CM

## 2019-01-30 LAB
ALBUMIN SERPL-MCNC: 4.2 G/DL (ref 3–4.8)
ALBUMIN/GLOB SERPL: 1.3 {RATIO} (ref 1.1–1.8)
ALT SERPL-CCNC: 23 U/L (ref 7–56)
APPEARANCE UR: CLEAR
APTT BLD: 31.6 SECONDS (ref 26.9–38.3)
AST SERPL-CCNC: 19 U/L (ref 14–36)
BACTERIA #/AREA URNS HPF: (no result) /HPF
BASOPHILS # BLD AUTO: 0.01 K/MM3 (ref 0–2)
BASOPHILS NFR BLD: 0.1 % (ref 0–3)
BILIRUB UR-MCNC: NEGATIVE MG/DL
BNP SERPL-MCNC: 213 PG/ML (ref 0–450)
BUN SERPL-MCNC: 12 MG/DL (ref 7–21)
CALCIUM SERPL-MCNC: 9.7 MG/DL (ref 8.4–10.5)
COLOR UR: YELLOW
EOSINOPHIL # BLD: 0 10*3/UL (ref 0–0.7)
EOSINOPHIL NFR BLD: 0.1 % (ref 1.5–5)
ERYTHROCYTE [DISTWIDTH] IN BLOOD BY AUTOMATED COUNT: 12.7 % (ref 11.5–14.5)
GFR NON-AFRICAN AMERICAN: > 60
GLUCOSE UR STRIP-MCNC: NEGATIVE MG/DL
HGB BLD-MCNC: 11.3 G/DL (ref 12–16)
INR PPP: 1.03
LEUKOCYTE ESTERASE UR-ACNC: NEGATIVE LEU/UL
LIPASE SERPL-CCNC: 60 U/L (ref 23–300)
LYMPHOCYTES # BLD: 1.7 10*3/UL (ref 1.2–3.4)
LYMPHOCYTES NFR BLD AUTO: 14.8 % (ref 22–35)
MCH RBC QN AUTO: 31.1 PG (ref 25–35)
MCHC RBC AUTO-ENTMCNC: 34.8 G/DL (ref 31–37)
MCV RBC AUTO: 89.5 FL (ref 80–105)
MONOCYTES # BLD AUTO: 0.6 10*3/UL (ref 0.1–0.6)
MONOCYTES NFR BLD: 5.2 % (ref 1–6)
PH UR STRIP: 7 [PH] (ref 4.7–8)
PLATELET # BLD: 485 10^3/UL (ref 120–450)
PMV BLD AUTO: 8.2 FL (ref 7–11)
PROT UR STRIP-MCNC: NEGATIVE MG/DL
PROTHROMBIN TIME: 11.6 SECONDS (ref 9.4–12.5)
RBC # BLD AUTO: 3.63 10^6/UL (ref 3.5–6.1)
RBC # UR STRIP: (no result) /UL
SP GR UR STRIP: 1.02 (ref 1–1.03)
TROPONIN I SERPL-MCNC: < 0.01 NG/ML
UROBILINOGEN UR STRIP-ACNC: 0.2 E.U./DL
WBC # BLD AUTO: 11.1 10^3/UL (ref 4.5–11)

## 2019-01-30 NOTE — CARD
--------------- APPROVED REPORT --------------





Date of service: 01/30/2019



EKG Measurement

Heart [id number]

ME [id number]

[id number]

[id number]

[id number]



<Conclusion>

Normal sinus rhythm

Normal ECG

## 2019-01-30 NOTE — ED PDOC
Arrival/HPI





- General


Chief Complaint: Flu-like Symptoms


Time Seen by Provider: 01/30/19 11:07


Historian: Patient





- History of Present Illness


Narrative History of Present Illness (Text): 


68 y/o F c PMHx rheumatoid arthritis, chronic back pain, anxiety, HTN and 

depression p/w cough, shortness of breath x 3 days which she states has been 

keeping her up at night. She reports coughing up yellow/brown sputum. Denies 

fever, chills, chest pain, abdominal pain, vomiting, diarrhea, dysuria. Patient 

with previous admission earlier this month for delirium which resolved after 

Seroquel administration in consultation with psychiatry.











Past Medical History





- Provider Review


Nursing Documentation Reviewed: Yes





- Past History


Past History: No Previous





- Infectious Disease


Hx of Infectious Diseases: None





- Cardiac


Hx Cardiac Disorders: No





- Pulmonary


Hx Respiratory Disorders: No





- Neurological


Hx Neurological Disorder: No





- HEENT


Other/Comment: reading glasses





- Renal


Hx Renal Disorder: No





- Endocrine/Metabolic


Hx Endocrine Disorders: No





- Hematological/Oncological


Hx Blood Disorders: No


Other/Comment: left breast lumpectomy; benign





- Integumentary


Hx Dermatological Disorder: No





- Musculoskeletal/Rheumatological


Hx Falls: No


Hx Herniated Disk: Yes


Hx Osteoarthritis: Yes





- Gastrointestinal


Hx Gastrointestinal Disorders: No





- Genitourinary/Gynecological


Hx Sexually Transmitted Diseases: No





- Psychiatric


Hx Anxiety: Yes


Hx Panic Disorder: Yes


Hx Substance Use: No





- Surgical History


Hx Hysterectomy: Yes


Other/Comment: benign tumor removed left breast





- Anesthesia


Hx Anesthesia: Yes


Hx Anesthesia Reactions: No


Hx Malignant Hyperthermia: No





Family/Social History





- Physician Review


Nursing Documentation Reviewed: Yes


Family/Social History: No Known Family HX


Smoking Status: Heavy Smoker > 10 Cigarettes Daily


Hx Alcohol Use: Yes


Hx Substance Use: No





Allergies/Home Meds


Allergies/Adverse Reactions: 


Allergies





No Known Allergies Allergy (Verified 09/02/18 21:45)


   








Home Medications: 


                                    Home Meds











 Medication  Instructions  Recorded  Confirmed


 


RX: Cyclobenzaprine [Flexeril] 10 mg PO DAILY PRN 07/05/18 01/15/19


 


RX: Alprazolam [Xanax] 2 mg PO BID 01/15/19 01/15/19


 


RX: Gabapentin [Neurontin] 100 mg PO BID 01/15/19 01/15/19


 


RX: Oxycodone HCl/Acetaminophen 10 - 325 mg PO BID PRN 01/15/19 01/15/19





[Percocet  mg Tablet]   


 


RX: Temazepam [Restoril] 30 mg PO HS 01/15/19 01/15/19


 


RX: Famotidine [Pepcid] 20 mg PO DAILY 01/17/19 01/17/19


 


RX: Ketorolac Tromethamine 10 mg PO DAILY PRN 01/17/19 01/17/19





[Toradol]   














Review of Systems





- Physician Review


All systems were reviewed & negative as marked: Yes





- Review of Systems


Constitutional: absent: Fevers


Cardiovascular: absent: Chest Pain





Physical Exam





- Physical Exam


Narrative Physical Exam (Text): 


Gen: Coughing frequently


Head: NC/AT


Eyes: PERRL


ENT: MMM


Neck: Supple


Chest: No tenderness


CV: Regular rate


Lungs: CTA b/l


Abd: Soft, NT


Back: No CVA tenderness


Extremities: No edema


Skin: No rash


Neuro: Alert, no focal deficit


Vital Signs Reviewed: Yes





Vital Signs











  Temp Pulse Resp BP Pulse Ox


 


 01/30/19 11:01  98.4 F  80  19  175/85 H  98











Temperature: Afebrile


Blood Pressure: Hypertensive


Pulse: Regular


Respiratory Rate: Normal


Appearance: Positive for: Well-Appearing, Non-Toxic, Comfortable


Pain Distress: Mild


Mental Status: Positive for: Alert and Oriented X 3





Medical Decision Making


ED Course and Treatment: 


Differential after H&P includes influenza vs other viral illness vs PNA.





Discussed with Dr. Zapata who states patient develops the muscle spasms that she

 is now exhibiting when her sodium decreases. Dr. Platt accepts patient to 

his service.





CXR no acute disease.





- RAD Interpretation


Radiology Orders: 











01/30/19 11:20


CHEST PORTABLE [RAD] Stat 














- EKG Interpretation


EKG Interpretation (Text): 





01/30/19 11:29:47


EKG: Ordered, reviewed, and independently interpreted the EKG.


Rate : 73 BPM


Rhythm : NSR


Interpretation : No ST-segment elevations or depressions, no T-wave inversions, 

normal intervals.





Interpreted by ED Physician: Yes


Type: 12 lead EKG





- Medication Orders


Current Medication Orders: 











Albuterol/Ipratropium (Duoneb 3 Mg/0.5 Mg (3 Ml) Ud)  3 ml IH STAT STA


   Stop: 01/30/19 11:22


Sodium Chloride (Sodium Chloride 0.9%)  1,000 mls @ 999 mls/hr IV .Q1H1M STA


   Stop: 01/30/19 12:20


Ketorolac Tromethamine (Toradol)  15 mg IVP STAT STA


   Stop: 01/30/19 11:21


Ondansetron HCl (Zofran Inj)  8 mg IVP STAT STA


   Stop: 01/30/19 11:21











- Scribe Statement


The provider has reviewed the documentation as recorded by the Scribe





Kimberly Palma 





All medical record entries made by the Casimiroibchristian were at my direction and 

personally dictated by me. I have reviewed the chart and agree that the record 

accurately reflects my personal performance of the history, physical exam, 

medical decision making, and the department course for this patient. I have also

 personally directed, reviewed, and agree with the discharge instructions and 

disposition.





Disposition/Present on Arrival





- Present on Arrival


Any Indicators Present on Arrival: No


History of DVT/PE: No


History of Uncontrolled Diabetes: No


Urinary Catheter: No


History of Decub. Ulcer: No


History Surgical Site Infection Following: None





- Disposition


Have Diagnosis and Disposition been Completed?: Yes


Diagnosis: 


 Hyponatremia





Disposition: HOSPITALIZED


Disposition Time: 14:00


Patient Plan: Observation


Condition: STABLE

## 2019-01-30 NOTE — RAD
Date of service: 



01/30/2019



HISTORY:

 dyspnea, cough 



COMPARISON:

01/15/2019 



FINDINGS:



LUNGS:

No active pulmonary disease.



PLEURA:

No significant pleural effusion identified, no pneumothorax apparent.



CARDIOVASCULAR:

No atherosclerotic calcification present



Normal.



OSSEOUS STRUCTURES:

No significant abnormalities.



VISUALIZED UPPER ABDOMEN:

Normal.



OTHER FINDINGS:

None.



IMPRESSION:

No active disease. No significant interval change compared to the 

prior examination(s).

## 2019-01-31 LAB
% IRON SATURATION: 16 % (ref 20–55)
ALBUMIN SERPL-MCNC: 4.1 G/DL (ref 3–4.8)
ALBUMIN/GLOB SERPL: 1.4 {RATIO} (ref 1.1–1.8)
ALT SERPL-CCNC: 27 U/L (ref 7–56)
AST SERPL-CCNC: 22 U/L (ref 14–36)
BUN SERPL-MCNC: 18 MG/DL (ref 7–21)
BUN SERPL-MCNC: 9 MG/DL (ref 7–21)
CALCIUM SERPL-MCNC: 9 MG/DL (ref 8.4–10.5)
CALCIUM SERPL-MCNC: 9.6 MG/DL (ref 8.4–10.5)
CREATININE,RANDOM URINE: 35 MG/DL
ERYTHROCYTE [DISTWIDTH] IN BLOOD BY AUTOMATED COUNT: 12.9 % (ref 11.5–14.5)
GFR NON-AFRICAN AMERICAN: 49
GFR NON-AFRICAN AMERICAN: > 60
HGB BLD-MCNC: 11.2 G/DL (ref 12–16)
IRON SERPL-MCNC: 45 UG/DL (ref 45–180)
MCH RBC QN AUTO: 31.5 PG (ref 25–35)
MCHC RBC AUTO-ENTMCNC: 35.1 G/DL (ref 31–37)
MCV RBC AUTO: 89.6 FL (ref 80–105)
OSMOLALITY,URINE: 302 MOSM/KG (ref 300–1000)
PLATELET # BLD: 460 10^3/UL (ref 120–450)
PMV BLD AUTO: 8 FL (ref 7–11)
RBC # BLD AUTO: 3.56 10^6/UL (ref 3.5–6.1)
TIBC SERPL-MCNC: 274 UG/DL (ref 265–497)
URATE SERPL-MCNC: 4.3 MG/DL (ref 2.5–6.2)
WBC # BLD AUTO: 12.5 10^3/UL (ref 4.5–11)

## 2019-01-31 RX ADMIN — GUAIFENESIN SCH MG: 600 TABLET, EXTENDED RELEASE ORAL at 19:12

## 2019-01-31 NOTE — CP.PCM.HP
<Roshni Simpson - Last Filed: 01/31/19 19:40>





History of Present Illness





- History of Present Illness


History of Present Illness: 


Please note history as per EMR as patient is a poor historian





70yo female PMHx Rheumatoid arthritis, chronic back pain, anxiety, HTN and 

depression presents with cough and SOB for 3 days. Patient reported coughing 

yellow/brown sputum which was keeping her up at night. She denied fever, chills,

chest pain, abdominal pain, vomiting, diarrhea, dysuria. Patient this AM 

admitted to anxiety and diffuse body aches.





Pmhx: Rheumatoid arthritis, chronic back pain, anxiety, HTN and depression


Pshx: L breast benign tumor


Meds: Celexa 40 po qd, flexeril 10qd, neurotin 100 BID, percocet , 

restoril 30, Xanax 2 BID


All: NKDA


Social: Unable to obtain


Fam: Unable to obtain


PMD: Marci Zapata


Psych: Dr. Chato Ugalde


Pharm: [facility]





Present on Admission





- Present on Admission


Any Indicators Present on Admission: No





Review of Systems





- Review of Systems


Systems not reviewed;Unavailable: Altered Mental Status





Past Patient History





- Infectious Disease


Hx of Infectious Diseases: None





- Past Social History


Smoking Status: Former Smoker





- CARDIAC


Hx Cardiac Disorders: No





- PULMONARY


Hx Respiratory Disorders: No





- NEUROLOGICAL


Hx Neurological Disorder: No





- HEENT


Other/Comment: reading glasses





- RENAL


Hx Chronic Kidney Disease: No





- ENDOCRINE/METABOLIC


Hx Endocrine Disorders: No





- HEMATOLOGICAL/ONCOLOGICAL


Hx Blood Disorders: No


Other/Comment: left breast lumpectomy; benign





- INTEGUMENTARY


Hx Dermatological Problems: No





- MUSCULOSKELETAL/RHEUMATOLOGICAL


Hx Falls: No





- GASTROINTESTINAL


Hx Gastrointestinal Disorders: No





- GENITOURINARY/GYNECOLOGICAL


Hx Sexually Transmitted Disorders: No





- PSYCHIATRIC


Hx Substance Use: No





- SURGICAL HISTORY


Hx Hysterectomy: Yes


Other/Comment: benign tumor removed left breast





- ANESTHESIA


Hx Anesthesia: Yes


Hx Anesthesia Reactions: No


Hx Malignant Hyperthermia: No





Meds


Allergies/Adverse Reactions: 


                                    Allergies











Allergy/AdvReac Type Severity Reaction Status Date / Time


 


No Known Allergies Allergy   Verified 09/02/18 21:45














Physical Exam





- Additional Findings


Additional findings: 





- Constitutional


Appears: Non-toxic, No Acute Distress, Confused





- Head Exam


Head Exam: ATRAUMATIC, NORMAL INSPECTION, NORMOCEPHALIC





- Eye Exam


Eye Exam: EOMI, Normal appearance, PERRL





- Respiratory Exam


Respiratory Exam: Clear to Auscultation Bilateral, NORMAL BREATHING PATTERN.  a

bsent: Accessory Muscle Use, Rales, Rhonchi, Wheezes, Respiratory Distress, 

Stridor





- Cardiovascular Exam


Cardiovascular Exam: RRR, +S1, +S2.  absent: Gallop, Rubs





- GI/Abdominal Exam


GI & Abdominal Exam: Normal Bowel Sounds, Soft.  absent: Distended, Firm, 

Guarding, Tenderness





- Extremities Exam


Extremities exam: Positive for: normal inspection.  Negative for: calf 

tenderness, pedal edema





- Back Exam


Back exam: NORMAL INSPECTION.  absent: CVA tenderness (L), CVA tenderness (R)





- Neurological Exam


Neurological exam: Altered


Additional comments: 





- Psychiatric Exam


Psychiatric exam: Anxious





Results





- Vital Signs


Recent Vital Signs: 





                                Last Vital Signs











Temp  98.4 F   01/30/19 22:00


 


Pulse  69   01/30/19 22:00


 


Resp  18   01/31/19 04:13


 


BP  159/83 H  01/30/19 22:00


 


Pulse Ox  97   01/30/19 22:00














- Labs


Result Diagrams: 


                                 01/31/19 07:15





                                 01/31/19 07:15


Labs: 





                         Laboratory Results - last 24 hr











  01/30/19 01/30/19 01/30/19





  11:10 11:10 11:10


 


WBC   11.1 H 


 


RBC   3.63 


 


Hgb   11.3 L 


 


Hct   32.5 L 


 


MCV   89.5 


 


MCH   31.1 


 


MCHC   34.8 


 


RDW   12.7 


 


Plt Count   485 H 


 


MPV   8.2 


 


Neut % (Auto)   79.8 H 


 


Lymph % (Auto)   14.8 L 


 


Mono % (Auto)   5.2 


 


Eos % (Auto)   0.1 L 


 


Baso % (Auto)   0.1 


 


Lymph # (Auto)   1.7 


 


Mono # (Auto)   0.6 


 


Eos # (Auto)   0.0 


 


Baso # (Auto)   0.01 


 


Absolute Neuts (auto)   8.87 H 


 


PT    11.6


 


INR    1.03


 


APTT    31.6


 


Sodium  126 L  


 


Potassium  4.6  


 


Chloride  94 L  


 


Carbon Dioxide  25  


 


Anion Gap  12  


 


BUN  12  


 


Creatinine  0.6 L  


 


Est GFR ( Amer)  > 60  


 


Est GFR (Non-Af Amer)  > 60  


 


Random Glucose  140 H  


 


Serum Osmolality   


 


Calcium  9.7  


 


Total Bilirubin  0.2  


 


AST  19  


 


ALT  23  


 


Alkaline Phosphatase  57  


 


Total Creatine Kinase  < 20 L  


 


Troponin I  < 0.01  


 


NT-Pro-B Natriuret Pep  213  


 


Total Protein  7.5  


 


Albumin  4.2  


 


Globulin  3.2  


 


Albumin/Globulin Ratio  1.3  


 


Lipase  60  


 


Urine Color   


 


Urine Appearance   


 


Urine pH   


 


Ur Specific Gravity   


 


Urine Protein   


 


Urine Glucose (UA)   


 


Urine Ketones   


 


Urine Blood   


 


Urine Nitrate   


 


Urine Bilirubin   


 


Urine Urobilinogen   


 


Ur Leukocyte Esterase   


 


Urine RBC   


 


Urine WBC   


 


Ur Epithelial Cells   


 


Urine Bacteria   


 


Influenza Typ A,B (EIA)   














  01/30/19 01/30/19 01/30/19





  11:10 16:43 20:20


 


WBC   


 


RBC   


 


Hgb   


 


Hct   


 


MCV   


 


MCH   


 


MCHC   


 


RDW   


 


Plt Count   


 


MPV   


 


Neut % (Auto)   


 


Lymph % (Auto)   


 


Mono % (Auto)   


 


Eos % (Auto)   


 


Baso % (Auto)   


 


Lymph # (Auto)   


 


Mono # (Auto)   


 


Eos # (Auto)   


 


Baso # (Auto)   


 


Absolute Neuts (auto)   


 


PT   


 


INR   


 


APTT   


 


Sodium   


 


Potassium   


 


Chloride   


 


Carbon Dioxide   


 


Anion Gap   


 


BUN   


 


Creatinine   


 


Est GFR ( Amer)   


 


Est GFR (Non-Af Amer)   


 


Random Glucose   


 


Serum Osmolality    262 L


 


Calcium   


 


Total Bilirubin   


 


AST   


 


ALT   


 


Alkaline Phosphatase   


 


Total Creatine Kinase   


 


Troponin I   


 


NT-Pro-B Natriuret Pep   


 


Total Protein   


 


Albumin   


 


Globulin   


 


Albumin/Globulin Ratio   


 


Lipase   


 


Urine Color   Yellow 


 


Urine Appearance   Clear 


 


Urine pH   7.0 


 


Ur Specific Gravity   1.020 


 


Urine Protein   Negative 


 


Urine Glucose (UA)   Negative 


 


Urine Ketones   Negative 


 


Urine Blood   Trace-lysed H 


 


Urine Nitrate   Negative 


 


Urine Bilirubin   Negative 


 


Urine Urobilinogen   0.2 


 


Ur Leukocyte Esterase   Negative 


 


Urine RBC   2 - 5 H 


 


Urine WBC   1 - 3 


 


Ur Epithelial Cells   4 - 5 


 


Urine Bacteria   Few 


 


Influenza Typ A,B (EIA)  Negative for flu a/b  














Assessment & Plan





- Assessment and Plan (Free Text)


Assessment: 





-Hyponatremia


-Cough


-Anxiety


-HTN


-Chronic back pain


-Depression


-Rheumatoid Arthritis


-Tobacco abuse


-Gait dysfunction


-Frequent falls


Plan: 





Patient's vitals, blood work, imaging noted. Serum Na 126 on admission, Serum 

Osm 262, Urine Osm 302, Urine Na 95. F/u FLP, HgbA1c, Thyroid studies, anemia 

panel, and uric acid. Suspect SIADH as cause of Hyponatremia. Patient on NS@100.

Correct hyponatremia at rate of 6-8meq in 24 hours. Patient's CXR unremarkable. 

Mucinex and Robitussin on board. Patient has xanax and celexa ordered for 

anxiety and depression. Continue home neurontin. Maintain normotension and 

continue home lopressor. Nicoderm patch ordered. Smoking cessation counseling on

board. PT on board for gait dysfunction and frequent falls.





Discussed with Dr. Giulia Simpson PGY3





<Loi Platt - Last Filed: 01/31/19 21:24>





Results





- Vital Signs


Recent Vital Signs: 





                                Last Vital Signs











Temp  98.2 F   01/31/19 14:00


 


Pulse  75   01/31/19 19:12


 


Resp  18   01/31/19 14:00


 


BP  139/73   01/31/19 19:12


 


Pulse Ox  94 L  01/31/19 14:00














- Labs


Result Diagrams: 


                                 01/31/19 07:15





                                 01/31/19 20:28


Labs: 





                         Laboratory Results - last 24 hr











  01/30/19 01/31/19 01/31/19





  16:42 07:15 07:15


 


WBC   12.5 H 


 


RBC   3.56 


 


Hgb   11.2 L 


 


Hct   31.9 L 


 


MCV   89.6 


 


MCH   31.5 


 


MCHC   35.1 


 


RDW   12.9 


 


Plt Count   460 H 


 


MPV   8.0 


 


Retic Count   


 


Sodium    129 L


 


Potassium    4.8


 


Chloride    95 L


 


Carbon Dioxide    26


 


Anion Gap    12


 


BUN    9


 


Creatinine    0.7


 


Est GFR ( Amer)    > 60


 


Est GFR (Non-Af Amer)    > 60


 


POC Glucose (mg/dL)  126 H  


 


Random Glucose    118 H


 


Uric Acid   


 


Calcium    9.6


 


Phosphorus    2.9


 


Magnesium    1.8


 


Iron   


 


TIBC   


 


% Saturation   


 


Total Bilirubin    0.3


 


AST    22


 


ALT    27


 


Alkaline Phosphatase    60


 


Total Protein    7.2


 


Albumin    4.1


 


Globulin    3.0


 


Albumin/Globulin Ratio    1.4


 


Urine Osmolality   


 


Ur Random Creatinine   


 


Ur Random Sodium   


 


Ur Random Uric Acid   














  01/31/19 01/31/19 01/31/19





  18:00 19:00 20:28


 


WBC   


 


RBC   


 


Hgb   


 


Hct   


 


MCV   


 


MCH   


 


MCHC   


 


RDW   


 


Plt Count   


 


MPV   


 


Retic Count   


 


Sodium    129 L


 


Potassium    4.0


 


Chloride    97 L


 


Carbon Dioxide    26


 


Anion Gap    10


 


BUN    18


 


Creatinine    1.1


 


Est GFR ( Amer)    60


 


Est GFR (Non-Af Amer)    49


 


POC Glucose (mg/dL)   


 


Random Glucose    105


 


Uric Acid    4.3


 


Calcium    9.0


 


Phosphorus   


 


Magnesium   


 


Iron   


 


TIBC   


 


% Saturation   


 


Total Bilirubin   


 


AST   


 


ALT   


 


Alkaline Phosphatase   


 


Total Protein   


 


Albumin   


 


Globulin   


 


Albumin/Globulin Ratio   


 


Urine Osmolality  302  


 


Ur Random Creatinine   35 


 


Ur Random Sodium  95  


 


Ur Random Uric Acid   9.2 














  01/31/19 01/31/19





  20:28 20:35


 


WBC  


 


RBC  


 


Hgb  


 


Hct  


 


MCV  


 


MCH  


 


MCHC  


 


RDW  


 


Plt Count  


 


MPV  


 


Retic Count  1.42 


 


Sodium  


 


Potassium  


 


Chloride  


 


Carbon Dioxide  


 


Anion Gap  


 


BUN  


 


Creatinine  


 


Est GFR ( Amer)  


 


Est GFR (Non-Af Amer)  


 


POC Glucose (mg/dL)  


 


Random Glucose  


 


Uric Acid  


 


Calcium  


 


Phosphorus  


 


Magnesium  


 


Iron   45


 


TIBC   274


 


% Saturation   16 L


 


Total Bilirubin  


 


AST  


 


ALT  


 


Alkaline Phosphatase  


 


Total Protein  


 


Albumin  


 


Globulin  


 


Albumin/Globulin Ratio  


 


Urine Osmolality  


 


Ur Random Creatinine  


 


Ur Random Sodium  


 


Ur Random Uric Acid  














Assessment & Plan





- Assessment and Plan (Free Text)


Plan: 





Pt seen and examined by me. I have reviewed the note of the medical resident and

I agree with it. I have discussed the assessment and plan with the resident. I 

have reviewed the medications and the last labs. Pt with hyponatremia. Possible 

hypovolemia vs euvolemia.The Na did start to improve with NS. She is on Clexa 

and Xanax for her anxiety and depression. She is on a nicdoerm patch for her 

smoking. She was advised to quit smoking. Will get PT evaluation for fall.

## 2019-01-31 NOTE — CP.PCM.APN
Subjective





- Date & Time of Evaluation


Date of Evaluation: 01/31/19


Time of Evaluation: 10:00





- Subjective


Subjective: 





pt seen and examined at bedside 





Review of Systems





- Review of Systems


All systems: reviewed and no additional remarkable complaints except





Objective





- Vital Signs/Intake and Output


Vital Signs (last 24 hours): 


                                        











Temp Pulse Resp BP Pulse Ox


 


 98.1 F   76   18   189/93 H  96 


 


 01/31/19 06:00  01/31/19 10:57  01/31/19 06:00  01/31/19 10:57  01/31/19 06:00








Intake and Output: 


                                        











 01/31/19 01/31/19





 06:59 18:59


 


Intake Total 1440 


 


Balance 1440 














- Medications


Medications: 


                               Current Medications





Alprazolam (Xanax)  2 mg PO BID PRN


   PRN Reason: Anxiety


   Last Admin: 01/31/19 10:58 Dose:  2 mg


Aspirin (Ecotrin)  81 mg PO DAILY [facility]


   Last Admin: 01/31/19 10:57 Dose:  81 mg


Citalopram Hydrobromide (Celexa)  40 mg PO DAILY [facility]


   Last Admin: 01/31/19 10:57 Dose:  40 mg


Cyclobenzaprine HCl (Flexeril)  5 mg PO DAILY PRN


   PRN Reason: Muscle spasm


Gabapentin (Neurontin)  300 mg PO TID [facility]; Protocol


   Last Admin: 01/31/19 10:58 Dose:  300 mg


Guaifenesin (Mucinex La)  600 mg PO BID [facility]


Guaifenesin/Dextromethorphan (Robitussin Dm)  5 ml PO Q4H PRN


   PRN Reason: Cough


Sodium Chloride (Sodium Chloride 0.9%)  1,000 mls @ 100 mls/hr IV .Q10H [facility]


   Last Admin: 01/31/19 11:07 Dose:  100 mls/hr


Metoprolol Tartrate (Lopressor)  25 mg PO BID [facility]


   Last Admin: 01/31/19 10:57 Dose:  25 mg


Nicotine (Nicoderm Cq)  1 patch TD DAILY [facility]


   Last Admin: 01/31/19 10:59 Dose:  1 patch


Ondansetron HCl (Zofran Inj)  4 mg IVP Q6H PRN


   PRN Reason: Nausea/Vomiting


   Last Admin: 01/31/19 10:56 Dose:  4 mg


Quetiapine Fumarate (Seroquel)  50 mg PO [facility]; Protocol











- Labs


Labs: 


                                        





                                 01/31/19 07:15 





                                 01/31/19 07:15 





                                        











PT  11.6 SECONDS (9.4-12.5)   01/30/19  11:10    


 


INR  1.03   01/30/19  11:10    


 


APTT  31.6 Seconds (26.9-38.3)   01/30/19  11:10    














Assessment and Plan





- Assessment and Plan (Free Text)


Plan: 





pt admit with hyponatremia s/p treatment for dc home today 





BPCI/TIC





- BPCIA/TIC


Educated pt/family on BPCIA/CIR/Med to Bed Programs: N/A


Flyers given, including CMS Beneficiary letter: N/A


Pt/family verbalized understanding & agreed to program: N/A

## 2019-02-01 VITALS
DIASTOLIC BLOOD PRESSURE: 88 MMHG | HEART RATE: 55 BPM | SYSTOLIC BLOOD PRESSURE: 160 MMHG | OXYGEN SATURATION: 96 % | TEMPERATURE: 98.8 F

## 2019-02-01 VITALS — RESPIRATION RATE: 20 BRPM

## 2019-02-01 LAB
ALBUMIN SERPL-MCNC: 3.9 G/DL (ref 3–4.8)
ALBUMIN/GLOB SERPL: 1.3 {RATIO} (ref 1.1–1.8)
ALT SERPL-CCNC: 23 U/L (ref 7–56)
AST SERPL-CCNC: 18 U/L (ref 14–36)
BASOPHILS # BLD AUTO: 0.02 K/MM3 (ref 0–2)
BASOPHILS NFR BLD: 0.2 % (ref 0–3)
BUN SERPL-MCNC: 17 MG/DL (ref 7–21)
CALCIUM SERPL-MCNC: 9.2 MG/DL (ref 8.4–10.5)
EOSINOPHIL # BLD: 0.1 10*3/UL (ref 0–0.7)
EOSINOPHIL NFR BLD: 0.5 % (ref 1.5–5)
ERYTHROCYTE [DISTWIDTH] IN BLOOD BY AUTOMATED COUNT: 13.2 % (ref 11.5–14.5)
FERRITIN SERPL-MCNC: 65.9 NG/ML
GFR NON-AFRICAN AMERICAN: 55
HDLC SERPL-MCNC: 49 MG/DL (ref 29–60)
HGB BLD-MCNC: 10.7 G/DL (ref 12–16)
LDLC SERPL-MCNC: 143 MG/DL (ref 0–129)
LYMPHOCYTES # BLD: 2.2 10*3/UL (ref 1.2–3.4)
LYMPHOCYTES NFR BLD AUTO: 20 % (ref 22–35)
MCH RBC QN AUTO: 30.8 PG (ref 25–35)
MCHC RBC AUTO-ENTMCNC: 33.9 G/DL (ref 31–37)
MCV RBC AUTO: 91.1 FL (ref 80–105)
MONOCYTES # BLD AUTO: 0.8 10*3/UL (ref 0.1–0.6)
MONOCYTES NFR BLD: 7.6 % (ref 1–6)
PLATELET # BLD: 448 10^3/UL (ref 120–450)
PMV BLD AUTO: 7.9 FL (ref 7–11)
RBC # BLD AUTO: 3.47 10^6/UL (ref 3.5–6.1)
WBC # BLD AUTO: 10.7 10^3/UL (ref 4.5–11)

## 2019-02-01 RX ADMIN — GUAIFENESIN SCH MG: 600 TABLET, EXTENDED RELEASE ORAL at 09:00

## 2019-02-01 NOTE — CP.PCM.PN
Objective





- Vital Signs/Intake and Output


Vital Signs (last 24 hours): 


                                        











Temp Pulse Resp BP Pulse Ox


 


 98.8 F   55 L  18   160/88 H  96 


 


 02/01/19 06:00  02/01/19 06:00  02/01/19 06:00  02/01/19 06:00  02/01/19 06:00








Intake and Output: 


                                        











 02/01/19 02/01/19





 06:59 18:59


 


Intake Total 120 


 


Balance 120 














- Medications


Medications: 


                               Current Medications





Alprazolam (Xanax)  2 mg PO BID PRN


   PRN Reason: Anxiety


   Last Admin: 01/31/19 19:12 Dose:  2 mg


Aspirin (Ecotrin)  81 mg PO DAILY [facility]


   Last Admin: 01/31/19 10:57 Dose:  81 mg


Citalopram Hydrobromide (Celexa)  40 mg PO DAILY [facility]


   Last Admin: 01/31/19 10:57 Dose:  40 mg


Cyclobenzaprine HCl (Flexeril)  5 mg PO DAILY PRN


   PRN Reason: Muscle spasm


   Last Admin: 01/31/19 22:11 Dose:  5 mg


Gabapentin (Neurontin)  300 mg PO TID [facility]; Protocol


   Last Admin: 01/31/19 19:12 Dose:  300 mg


Guaifenesin (Mucinex La)  600 mg PO BID [facility]


   Last Admin: 01/31/19 19:12 Dose:  600 mg


Guaifenesin/Dextromethorphan (Robitussin Dm)  5 ml PO Q4H PRN


   PRN Reason: Cough


   Last Admin: 01/31/19 21:21 Dose:  5 ml


Sodium Chloride (Sodium Chloride 0.9%)  1,000 mls @ 100 mls/hr IV .Q10H [facility]


   Last Admin: 01/31/19 22:12 Dose:  100 mls/hr


Metoprolol Tartrate (Lopressor)  25 mg PO BID [facility]


   Last Admin: 01/31/19 19:12 Dose:  25 mg


Nicotine (Nicoderm Cq)  1 patch TD DAILY [facility]


   Last Admin: 01/31/19 10:59 Dose:  1 patch


Ondansetron HCl (Zofran Inj)  4 mg IVP Q6H PRN


   PRN Reason: Nausea/Vomiting


   Last Admin: 01/31/19 10:56 Dose:  4 mg


Quetiapine Fumarate (Seroquel)  50 mg PO HS [facility]; Protocol


   Last Admin: 01/31/19 21:21 Dose:  50 mg











- Labs


Labs: 


                                        





                                 02/01/19 07:50 





                                 02/01/19 07:50 





                                        











PT  11.6 SECONDS (9.4-12.5)   01/30/19  11:10    


 


INR  1.03   01/30/19  11:10    


 


APTT  31.6 Seconds (26.9-38.3)   01/30/19  11:10

## 2019-02-01 NOTE — CP.PCM.DIS
<Roshni Simpson - Last Filed: 02/01/19 11:16>





Provider





- Provider


Date of Admission: 


01/31/19 11:27





Attending physician: 


Loi Platt MD





Primary care physician: 


Dr. Zapata


Time Spent in preparation of Discharge (in minutes): 25





Hospital Course





- Lab Results


Lab Results: 


                             Most Recent Lab Values











WBC  10.7 10^3/uL (4.5-11.0)   02/01/19  07:50    


 


RBC  3.47 10^6/uL (3.5-6.1)  L  02/01/19  07:50    


 


Hgb  10.7 g/dL (12.0-16.0)  L  02/01/19  07:50    


 


Hct  31.6 % (36.0-48.0)  L  02/01/19  07:50    


 


MCV  91.1 fl (80.0-105.0)   02/01/19  07:50    


 


MCH  30.8 pg (25.0-35.0)   02/01/19  07:50    


 


MCHC  33.9 g/dl (31.0-37.0)   02/01/19  07:50    


 


RDW  13.2 % (11.5-14.5)   02/01/19  07:50    


 


Plt Count  448 10^3/uL (120.0-450.0)   02/01/19  07:50    


 


MPV  7.9 fl (7.0-11.0)   02/01/19  07:50    


 


Neut % (Auto)  71.7 % (50.0-68.0)  H  02/01/19  07:50    


 


Lymph % (Auto)  20.0 % (22.0-35.0)  L  02/01/19  07:50    


 


Mono % (Auto)  7.6 % (1.0-6.0)  H  02/01/19  07:50    


 


Eos % (Auto)  0.5 % (1.5-5.0)  L  02/01/19  07:50    


 


Baso % (Auto)  0.2 % (0.0-3.0)   02/01/19  07:50    


 


Lymph # (Auto)  2.2  (1.2-3.4)   02/01/19  07:50    


 


Mono # (Auto)  0.8  (0.1-0.6)  H  02/01/19  07:50    


 


Eos # (Auto)  0.1  (0.0-0.7)   02/01/19  07:50    


 


Baso # (Auto)  0.02 K/mm3 (0.0-2.0)   02/01/19  07:50    


 


Absolute Neuts (auto)  7.69  (1.4-6.5)  H  02/01/19  07:50    


 


Retic Count  1.42 % (0.5-1.5)   01/31/19  20:28    


 


PT  11.6 SECONDS (9.4-12.5)   01/30/19  11:10    


 


INR  1.03   01/30/19  11:10    


 


APTT  31.6 Seconds (26.9-38.3)   01/30/19  11:10    


 


Sodium  131 mmol/L (132-148)  L  02/01/19  07:50    


 


Potassium  4.6 mmol/L (3.6-5.0)   02/01/19  07:50    


 


Chloride  98 mmol/L ()   02/01/19  07:50    


 


Carbon Dioxide  28 mmol/L (21-33)   02/01/19  07:50    


 


Anion Gap  10  (10-20)   02/01/19  07:50    


 


BUN  17 mg/dL (7-21)   02/01/19  07:50    


 


Creatinine  1.0 mg/dl (0.7-1.2)   02/01/19  07:50    


 


Est GFR ( Amer)  > 60   02/01/19  07:50    


 


Est GFR (Non-Af Amer)  55   02/01/19  07:50    


 


POC Glucose (mg/dL)  126 mg/dL ()  H  01/30/19  16:42    


 


Random Glucose  102 mg/dL ()   02/01/19  07:50    


 


Serum Osmolality  262 mosm/kg (272-300)  L  01/30/19  20:20    


 


Uric Acid  4.3 mg/dL (2.5-6.2)   01/31/19  20:28    


 


Calcium  9.2 mg/dL (8.4-10.5)   02/01/19  07:50    


 


Phosphorus  3.1 mg/dL (2.5-4.5)   02/01/19  07:50    


 


Magnesium  1.8 mg/dL (1.7-2.2)   02/01/19  07:50    


 


Iron  45 ug/dL ()   01/31/19  20:35    


 


TIBC  274 ug/dL (265-497)   01/31/19  20:35    


 


% Saturation  16 % (20-55)  L  01/31/19  20:35    


 


Total Bilirubin  0.3 mg/dL (0.2-1.3)   02/01/19  07:50    


 


AST  18 U/L (14-36)   02/01/19  07:50    


 


ALT  23 U/L (7-56)   02/01/19  07:50    


 


Alkaline Phosphatase  52 U/L ()   02/01/19  07:50    


 


Total Creatine Kinase  < 20 U/L ()  L  01/30/19  11:10    


 


Troponin I  < 0.01 ng/mL  01/30/19  11:10    


 


NT-Pro-B Natriuret Pep  213 pg/mL (0-450)   01/30/19  11:10    


 


Total Protein  6.7 g/dL (5.8-8.3)   02/01/19  07:50    


 


Albumin  3.9 g/dL (3.0-4.8)   02/01/19  07:50    


 


Globulin  2.9 gm/dL  02/01/19  07:50    


 


Albumin/Globulin Ratio  1.3  (1.1-1.8)   02/01/19  07:50    


 


Triglycerides  78 mg/dL ()   02/01/19  07:50    


 


Cholesterol  230 mg/dL (130-200)  H  02/01/19  07:50    


 


LDL Cholesterol Direct  143 mg/dL (0-129)  H  02/01/19  07:50    


 


HDL Cholesterol  49 mg/dL (29-60)   02/01/19  07:50    


 


Lipase  60 U/L ()   01/30/19  11:10    


 


TSH 3rd Generation  0.26 mIU/mL (0.46-4.68)  L  02/01/19  07:50    


 


Urine Color  Yellow  (YELLOW)   01/30/19  16:43    


 


Urine Appearance  Clear  (CLEAR)   01/30/19  16:43    


 


Urine pH  7.0  (4.7-8.0)   01/30/19  16:43    


 


Ur Specific Gravity  1.020  (1.005-1.035)   01/30/19  16:43    


 


Urine Protein  Negative mg/dL (<30 mg/dL)   01/30/19  16:43    


 


Urine Glucose (UA)  Negative mg/dL (NEGATIVE)   01/30/19  16:43    


 


Urine Ketones  Negative mg/dL (NEGATIVE)   01/30/19  16:43    


 


Urine Blood  Trace-lysed  (NEGATIVE)  H  01/30/19  16:43    


 


Urine Nitrate  Negative  (NEGATIVE)   01/30/19  16:43    


 


Urine Bilirubin  Negative  (NEGATIVE)   01/30/19  16:43    


 


Urine Urobilinogen  0.2 E.U./dL (<1 E.U./dL)   01/30/19  16:43    


 


Ur Leukocyte Esterase  Negative Karissa/uL (NEGATIVE)   01/30/19  16:43    


 


Urine RBC  2 - 5 /hpf (0-2)  H  01/30/19  16:43    


 


Urine WBC  1 - 3 /hpf (0-6)   01/30/19  16:43    


 


Ur Epithelial Cells  4 - 5 /hpf (0-5)   01/30/19  16:43    


 


Urine Bacteria  Few /hpf (NONE)   01/30/19  16:43    


 


Urine Osmolality  302 mosm/kg (300-1000)   01/31/19  18:00    


 


Ur Random Creatinine  35 mg/dL  01/31/19  19:00    


 


Ur Random Sodium  95 meq/L  01/31/19  18:00    


 


Ur Random Uric Acid  9.2 mg/dL  01/31/19  19:00    


 


Influenza Typ A,B (EIA)  Negative for flu a/b  (NEGATIVE)   01/30/19  11:10    














- Hospital Course


Hospital Course: 





Upon Admission


As per HPI: 70yo female PMHx Rheumatoid arthritis, chronic back pain, anxiety, 

HTN and depression presents with cough and SOB for 3 days. Patient reported 

coughing yellow/brown sputum which was keeping her up at night. She denied 

fever, chills, chest pain, abdominal pain, vomiting, diarrhea, dysuria. Patient 

this AM admitted to anxiety and diffuse body aches.





Hospital Course


Patient admitted to med/surg for further management. Serum Na 126 on admission, 

Serum Osm 262, Urine Osm 302, Urine Na 95. FLP revealed slightly elevated 

cholesterol and LDL. Patient advised to follow a heart healthy diet and follow 

up management as outpatient with her primary care doctor. Iron studies wnl. 

Suspect SIADH as cause of Hyponatremia. Patient on NS@100. Correct hyponatremia 

at rate of 6-8meq in 24 hours. Repeat Na 131. Patient's CXR unremarkable. 

Mucinex and Robitussin on board. Patient had home xanax and celexa on board for 

anxiety and depression. Continued on home neurontin as patient requested. 

Maintain normotension and continue home lopressor. Nicoderm patch ordered. 

Smoking cessation counseling on board. PT on board for gait dysfunction and 

frequent falls. On day of discharge patient deemed medically optimized for 

discharge home.





Discharge Instructions


You are being discharged from [facility].


Please resume all home medications as prescribed by your Primary Care Doctor.


Please follow up with your PMD Dr. Zapata within 7-10 days.


Please also follow up with your Psychiatrist Dr. Pacheco within 5-7 days.


If symptoms return please visit your nearest Emergency Room.





Patient voiced understanding and agreement with discharge plan.


For full hospital course please refer to EMR.





Discharge Exam





- Additional Findings


Additional findings: 





- Constitutional


Appears: Non-toxic, No Acute Distress





- Head Exam


Head Exam: ATRAUMATIC, NORMAL INSPECTION, NORMOCEPHALIC





- Eye Exam


Eye Exam: EOMI, Normal appearance, PERRL





- Respiratory Exam


Respiratory Exam: Clear to Auscultation Bilateral, NORMAL BREATHING PATTERN.  

absent: Accessory Muscle Use, Rales, Rhonchi, Wheezes, Respiratory Distress, 

Stridor





- Cardiovascular Exam


Cardiovascular Exam: RRR, +S1, +S2.  absent: Gallop, Rubs





- GI/Abdominal Exam


GI & Abdominal Exam: Normal Bowel Sounds, Soft.  absent: Distended, Firm, 

Guarding, Tenderness





- Extremities Exam


Extremities exam: Positive for: normal inspection.  Negative for: calf 

tenderness, pedal edema





- Back Exam


Back exam: NORMAL INSPECTION.  absent: CVA tenderness (L), CVA tenderness (R)





- Neurological Exam


Neurological exam: Altered


Additional comments: 





- Psychiatric Exam


Psychiatric exam: Anxious (improved from yesterday)





Discharge Plan





- Follow Up Plan


Condition: STABLE


Disposition: HOME/ ROUTINE


Instructions:  Generalized Anxiety Disorder, Hyponatremia (DC), Hyponatremia 

(DC), Hyponatremia (GEN)


Additional Instructions: 


You are being discharged from [facility].





Please resume all home medications as prescribed by your Primary Care Doctor.





Please follow up with your PMD Dr. Zapata within 7-10 days.


Please also follow up with your Psychiatrist Dr. Pacheco within 5-7 days.





If symptoms return please visit your nearest Emergency Room.


Referrals: 


Rigo Pacheco MD [Non-Staff] - 


Marci Zapata MD [Family Provider] - 





<Loi Platt - Last Filed: 02/04/19 17:11>





Provider





- Provider


Date of Admission: 


01/31/19 11:27





Attending physician: 


Loi Platt MD








Hospital Course





- Lab Results


Lab Results: 


                                  Micro Results





01/30/19 16:43   Urine Random   Urine Culture - Final


                            No Growth (<1,000 CFU/ML)





                             Most Recent Lab Values











WBC  10.7 10^3/uL (4.5-11.0)   02/01/19  07:50    


 


RBC  3.47 10^6/uL (3.5-6.1)  L  02/01/19  07:50    


 


Hgb  10.7 g/dL (12.0-16.0)  L  02/01/19  07:50    


 


Hct  31.6 % (36.0-48.0)  L  02/01/19  07:50    


 


MCV  91.1 fl (80.0-105.0)   02/01/19  07:50    


 


MCH  30.8 pg (25.0-35.0)   02/01/19  07:50    


 


MCHC  33.9 g/dl (31.0-37.0)   02/01/19  07:50    


 


RDW  13.2 % (11.5-14.5)   02/01/19  07:50    


 


Plt Count  448 10^3/uL (120.0-450.0)   02/01/19  07:50    


 


MPV  7.9 fl (7.0-11.0)   02/01/19  07:50    


 


Neut % (Auto)  71.7 % (50.0-68.0)  H  02/01/19  07:50    


 


Lymph % (Auto)  20.0 % (22.0-35.0)  L  02/01/19  07:50    


 


Mono % (Auto)  7.6 % (1.0-6.0)  H  02/01/19  07:50    


 


Eos % (Auto)  0.5 % (1.5-5.0)  L  02/01/19  07:50    


 


Baso % (Auto)  0.2 % (0.0-3.0)   02/01/19  07:50    


 


Lymph # (Auto)  2.2  (1.2-3.4)   02/01/19  07:50    


 


Mono # (Auto)  0.8  (0.1-0.6)  H  02/01/19  07:50    


 


Eos # (Auto)  0.1  (0.0-0.7)   02/01/19  07:50    


 


Baso # (Auto)  0.02 K/mm3 (0.0-2.0)   02/01/19  07:50    


 


Absolute Neuts (auto)  7.69  (1.4-6.5)  H  02/01/19  07:50    


 


Retic Count  1.42 % (0.5-1.5)   01/31/19  20:28    


 


PT  11.6 SECONDS (9.4-12.5)   01/30/19  11:10    


 


INR  1.03   01/30/19  11:10    


 


APTT  31.6 Seconds (26.9-38.3)   01/30/19  11:10    


 


Sodium  131 mmol/L (132-148)  L  02/01/19  07:50    


 


Potassium  4.6 mmol/L (3.6-5.0)   02/01/19  07:50    


 


Chloride  98 mmol/L ()   02/01/19  07:50    


 


Carbon Dioxide  28 mmol/L (21-33)   02/01/19  07:50    


 


Anion Gap  10  (10-20)   02/01/19  07:50    


 


BUN  17 mg/dL (7-21)   02/01/19  07:50    


 


Creatinine  1.0 mg/dl (0.7-1.2)   02/01/19  07:50    


 


Est GFR ( Amer)  > 60   02/01/19  07:50    


 


Est GFR (Non-Af Amer)  55   02/01/19  07:50    


 


POC Glucose (mg/dL)  126 mg/dL ()  H  01/30/19  16:42    


 


Random Glucose  102 mg/dL ()   02/01/19  07:50    


 


Hemoglobin A1c  5.7 % (4.2-6.5)   02/01/19  07:50    


 


Serum Osmolality  262 mosm/kg (272-300)  L  01/30/19  20:20    


 


Uric Acid  4.3 mg/dL (2.5-6.2)   01/31/19  20:28    


 


Calcium  9.2 mg/dL (8.4-10.5)   02/01/19  07:50    


 


Phosphorus  3.1 mg/dL (2.5-4.5)   02/01/19  07:50    


 


Magnesium  1.8 mg/dL (1.7-2.2)   02/01/19  07:50    


 


Iron  45 ug/dL ()   01/31/19  20:35    


 


TIBC  274 ug/dL (265-497)   01/31/19  20:35    


 


% Saturation  16 % (20-55)  L  01/31/19  20:35    


 


Transferrin  211.63 mg/dL (206-381)   01/31/19  20:28    


 


Ferritin  65.9 ng/mL  01/31/19  20:28    


 


Total Bilirubin  0.3 mg/dL (0.2-1.3)   02/01/19  07:50    


 


AST  18 U/L (14-36)   02/01/19  07:50    


 


ALT  23 U/L (7-56)   02/01/19  07:50    


 


Alkaline Phosphatase  52 U/L ()   02/01/19  07:50    


 


Total Creatine Kinase  < 20 U/L ()  L  01/30/19  11:10    


 


Troponin I  < 0.01 ng/mL  01/30/19  11:10    


 


NT-Pro-B Natriuret Pep  213 pg/mL (0-450)   01/30/19  11:10    


 


Total Protein  6.7 g/dL (5.8-8.3)   02/01/19  07:50    


 


Albumin  3.9 g/dL (3.0-4.8)   02/01/19  07:50    


 


Globulin  2.9 gm/dL  02/01/19  07:50    


 


Albumin/Globulin Ratio  1.3  (1.1-1.8)   02/01/19  07:50    


 


Triglycerides  78 mg/dL ()   02/01/19  07:50    


 


Cholesterol  230 mg/dL (130-200)  H  02/01/19  07:50    


 


LDL Cholesterol Direct  143 mg/dL (0-129)  H  02/01/19  07:50    


 


HDL Cholesterol  49 mg/dL (29-60)   02/01/19  07:50    


 


Lipase  60 U/L ()   01/30/19  11:10    


 


TSH 3rd Generation  0.26 mIU/mL (0.46-4.68)  L  02/01/19  07:50    


 


Urine Color  Yellow  (YELLOW)   01/30/19  16:43    


 


Urine Appearance  Clear  (CLEAR)   01/30/19  16:43    


 


Urine pH  7.0  (4.7-8.0)   01/30/19  16:43    


 


Ur Specific Gravity  1.020  (1.005-1.035)   01/30/19  16:43    


 


Urine Protein  Negative mg/dL (<30 mg/dL)   01/30/19  16:43    


 


Urine Glucose (UA)  Negative mg/dL (NEGATIVE)   01/30/19  16:43    


 


Urine Ketones  Negative mg/dL (NEGATIVE)   01/30/19  16:43    


 


Urine Blood  Trace-lysed  (NEGATIVE)  H  01/30/19  16:43    


 


Urine Nitrate  Negative  (NEGATIVE)   01/30/19  16:43    


 


Urine Bilirubin  Negative  (NEGATIVE)   01/30/19  16:43    


 


Urine Urobilinogen  0.2 E.U./dL (<1 E.U./dL)   01/30/19  16:43    


 


Ur Leukocyte Esterase  Negative Karissa/uL (NEGATIVE)   01/30/19  16:43    


 


Urine RBC  2 - 5 /hpf (0-2)  H  01/30/19  16:43    


 


Urine WBC  1 - 3 /hpf (0-6)   01/30/19  16:43    


 


Ur Epithelial Cells  4 - 5 /hpf (0-5)   01/30/19  16:43    


 


Urine Bacteria  Few /hpf (NONE)   01/30/19  16:43    


 


Urine Osmolality  302 mosm/kg (300-1000)   01/31/19  18:00    


 


Ur Random Creatinine  35 mg/dL  01/31/19  19:00    


 


Ur Random Sodium  95 meq/L  01/31/19  18:00    


 


Ur Random Uric Acid  9.2 mg/dL  01/31/19  19:00    


 


Influenza Typ A,B (EIA)  Negative for flu a/b  (NEGATIVE)   01/30/19  11:10    














- Hospital Course


Hospital Course: 





Pt seen and examined by me. Thisis a late entry.PtI have reviewed the note of 

the medical resident and I agree with it. I have discussed the assessment and 

plan with the resident. I have reviewed the medications and the last labs.Pt 

with hyponatremia that has improved. She will be discharged home and f/u with 

PMD.

## 2024-04-17 NOTE — RAD
Date of service: 



12/13/2018



PROCEDURE:  Bilateral Ankle Radiographs.



HISTORY:

 injury 



COMPARISON:

None available.



FINDINGS:



BONES:

Right Ankle:  Normal. No acute fracture. 



Left Ankle:   Normal. No acute fracture. 



JOINTS:

Right Ankle:  Normal. No osteoarthritis. Ankle mortise maintained. 

Talar dome intact.



Left Ankle: Normal. No osteoarthritis. Ankle mortise maintained. 

Talar dome intact.



SOFT TISSUES:

Right Ankle: Normal. 



Left Ankle: Normal. 



OTHER FINDINGS:

None.



IMPRESSION:

No acute fracture or dislocation.
Date of service: 



12/13/2018



PROCEDURE:  Bilateral Knee Radiographs.



HISTORY:

injury



COMPARISON:

None.



FINDINGS:



BONES:

Right Knee:  Normal. No fracture. 



Left Knee:  Normal. No fracture. 



JOINTS:

Right Knee:  Normal. No osteoarthritis. 



Left knee: Normal. No osteoarthritis. 



SOFT TISSUES:

Right Knee: Normal.



Left Knee: Normal.



JOINT EFFUSION:

Right Knee: None. 



Left Knee: None.



OTHER FINDINGS:

None.



IMPRESSION:

No acute fracture or dislocation.
Date of service: 



12/13/2018



PROCEDURE:  Radiographs of the bilateral Tibiae and Fibulae.



HISTORY:

injury



COMPARISON:

None available. 



TECHNIQUE:

Frontal and lateral views obtained. 



FINDINGS:



BONES:

RIGHT TIBIA:  No acute fracture or destructive lesion. 



LEFT TIBIA:  No acute fracture or destructive lesion. 



JOINT SPACES:

RIGHT TIBIA: Normal.



LEFT TIBIA: Normal.



SOFT TISSUES:

RIGHT TIBIA: Normal.



LEFT TIBIA: Normal.



OTHER FINDINGS:

None.



IMPRESSION:

No acute fracture or dislocation.
no gum bleeding/no nose bleeding/no skin lumps